# Patient Record
Sex: FEMALE | Race: WHITE | NOT HISPANIC OR LATINO
[De-identification: names, ages, dates, MRNs, and addresses within clinical notes are randomized per-mention and may not be internally consistent; named-entity substitution may affect disease eponyms.]

---

## 2020-03-04 ENCOUNTER — APPOINTMENT (OUTPATIENT)
Dept: UROGYNECOLOGY | Facility: CLINIC | Age: 35
End: 2020-03-04
Payer: COMMERCIAL

## 2020-03-04 VITALS
WEIGHT: 150 LBS | HEIGHT: 63 IN | DIASTOLIC BLOOD PRESSURE: 71 MMHG | BODY MASS INDEX: 26.58 KG/M2 | SYSTOLIC BLOOD PRESSURE: 126 MMHG

## 2020-03-04 DIAGNOSIS — Z78.9 OTHER SPECIFIED HEALTH STATUS: ICD-10-CM

## 2020-03-04 DIAGNOSIS — Z86.19 PERSONAL HISTORY OF OTHER INFECTIOUS AND PARASITIC DISEASES: ICD-10-CM

## 2020-03-04 DIAGNOSIS — Z87.891 PERSONAL HISTORY OF NICOTINE DEPENDENCE: ICD-10-CM

## 2020-03-04 DIAGNOSIS — Z82.49 FAMILY HISTORY OF ISCHEMIC HEART DISEASE AND OTHER DISEASES OF THE CIRCULATORY SYSTEM: ICD-10-CM

## 2020-03-04 DIAGNOSIS — Z87.09 PERSONAL HISTORY OF OTHER DISEASES OF THE RESPIRATORY SYSTEM: ICD-10-CM

## 2020-03-04 DIAGNOSIS — Z86.59 PERSONAL HISTORY OF OTHER MENTAL AND BEHAVIORAL DISORDERS: ICD-10-CM

## 2020-03-04 DIAGNOSIS — Z83.511 FAMILY HISTORY OF GLAUCOMA: ICD-10-CM

## 2020-03-04 DIAGNOSIS — Z83.3 FAMILY HISTORY OF DIABETES MELLITUS: ICD-10-CM

## 2020-03-04 DIAGNOSIS — Z82.3 FAMILY HISTORY OF STROKE: ICD-10-CM

## 2020-03-04 LAB
BILIRUB UR QL STRIP: NEGATIVE
CLARITY UR: CLEAR
COLLECTION METHOD: NORMAL
GLUCOSE UR-MCNC: NEGATIVE
HCG UR QL: 0.2 EU/DL
HGB UR QL STRIP.AUTO: NEGATIVE
KETONES UR-MCNC: NEGATIVE
LEUKOCYTE ESTERASE UR QL STRIP: NEGATIVE
NITRITE UR QL STRIP: NEGATIVE
PH UR STRIP: 6
PROT UR STRIP-MCNC: NEGATIVE
SP GR UR STRIP: 1.01

## 2020-03-04 PROCEDURE — 51701 INSERT BLADDER CATHETER: CPT

## 2020-03-04 PROCEDURE — 99204 OFFICE O/P NEW MOD 45 MIN: CPT | Mod: 25

## 2020-03-04 PROCEDURE — 81003 URINALYSIS AUTO W/O SCOPE: CPT | Mod: QW

## 2020-03-04 RX ORDER — ESCITALOPRAM OXALATE 10 MG/1
10 TABLET ORAL
Qty: 45 | Refills: 0 | Status: ACTIVE | COMMUNITY
Start: 2019-11-14

## 2020-03-04 NOTE — HISTORY OF PRESENT ILLNESS
[FreeTextEntry1] : 36 yo  female with complaints of prolapse and leaking of urine. First noticed the prolapse some time ago as she had to carry her child around quite a bit. She did pelvic floor PT and noticed the prolapse got much worse. Recently she did a heavy workout and the prolapse returned. She feels a vaginal protrusion. Feels pressure and heaviness. 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 Feels she empties fully. Leaks urine and felt the PT helped a great deal. Leaks with urgency and with activity. Not wearing pads. Denies urinary frequency. Not getting up at night. No bowel complaints.

## 2020-03-04 NOTE — OB HISTORY
[Vaginal ___] : [unfilled] vaginal delivery(s) [Definite ___ (Date)] : the last menstrual period was [unfilled] [Normal Amount/Duration] : was of a normal amount and duration [Regular Cycle Intervals] : periods have been regular [Last Pap Smear ___] : date of last pap smear was on [unfilled] [Abnormal Pap Smear] : abnormal pap smear [Sexually Active] : sexually active [Taking Estrogens] : is not taking estrogen replacement [FreeTextEntry1] : Largest baby 6#1oz. Had abn pap and colpo about 7 years ago and repeats have been normal.

## 2020-03-04 NOTE — DISCUSSION/SUMMARY
[FreeTextEntry1] : Mrs. MALDONADO is a 36 yo with moderate midline cystocele, mild uterine prolapse, rare mixed urinary incontinence. We talked about the etiology and natural progression of pelvic organ prolapse. We discussed the treatment options of a pessary, physical therapy or surgery. In terms of surgery we talked about open procedures, robotic assisted procedures and vaginal reconstruction with or without the utilization of graft material. She is still considering having another child. She had great success with PT in the past so I encouraged her to continue with that. She would like to try a pessary so I asked her to return for an initial pessary fitting. I gave her some literature on pelvic floor muscle strengthening. I was able to answer all of her questions.\par

## 2020-03-04 NOTE — PHYSICAL EXAM
[No Acute Distress] : in no acute distress [Well developed] : well developed [Oriented x3] : oriented to person, place, and time [Well Nourished] : ~L well nourished [No Edema] : ~T edema was not present [Warm and Dry] : was warm and dry to touch [Normal Gait] : gait was normal [Normal Appearance] : general appearance was normal [Aa ____] : Aa [unfilled] [2] : 2 [Ba ____] : Ba [unfilled] [C ____] : C [unfilled] [GH ____] : GH [unfilled] [PB ____] : PB [unfilled] [TVL ____] : TVL  [unfilled] [Ap ____] : Ap [unfilled] [Bp ____] : Bp [unfilled] [D ____] : D [unfilled] [Normal] : normal [Post Void Residual ____ml] : post void residual via catheterization was [unfilled] ml [Cough] : no cough [Tenderness] : ~T no ~M abdominal tenderness observed [Distended] : not distended [Hernia] : no hernia observed [FreeTextEntry3] : negative empty stress test

## 2020-03-11 ENCOUNTER — APPOINTMENT (OUTPATIENT)
Dept: UROGYNECOLOGY | Facility: CLINIC | Age: 35
End: 2020-03-11

## 2020-03-16 ENCOUNTER — APPOINTMENT (OUTPATIENT)
Dept: UROGYNECOLOGY | Facility: CLINIC | Age: 35
End: 2020-03-16

## 2020-03-18 ENCOUNTER — APPOINTMENT (OUTPATIENT)
Dept: UROGYNECOLOGY | Facility: CLINIC | Age: 35
End: 2020-03-18

## 2020-05-11 ENCOUNTER — RESULT CHARGE (OUTPATIENT)
Age: 35
End: 2020-05-11

## 2020-05-11 ENCOUNTER — APPOINTMENT (OUTPATIENT)
Dept: UROGYNECOLOGY | Facility: CLINIC | Age: 35
End: 2020-05-11
Payer: COMMERCIAL

## 2020-05-11 VITALS
SYSTOLIC BLOOD PRESSURE: 108 MMHG | HEIGHT: 63 IN | DIASTOLIC BLOOD PRESSURE: 71 MMHG | WEIGHT: 150 LBS | BODY MASS INDEX: 26.58 KG/M2

## 2020-05-11 LAB
BILIRUB UR QL STRIP: NORMAL
CLARITY UR: CLEAR
COLLECTION METHOD: NORMAL
GLUCOSE UR-MCNC: NEGATIVE
HCG UR QL: 0.2 EU/DL
HGB UR QL STRIP.AUTO: NEGATIVE
KETONES UR-MCNC: NEGATIVE
LEUKOCYTE ESTERASE UR QL STRIP: NEGATIVE
NITRITE UR QL STRIP: NEGATIVE
PH UR STRIP: 7
PROT UR STRIP-MCNC: NEGATIVE
SP GR UR STRIP: 1.02

## 2020-05-11 PROCEDURE — 99213 OFFICE O/P EST LOW 20 MIN: CPT | Mod: 25

## 2020-05-11 PROCEDURE — 51798 US URINE CAPACITY MEASURE: CPT

## 2020-05-11 PROCEDURE — 81003 URINALYSIS AUTO W/O SCOPE: CPT | Mod: QW

## 2020-05-11 PROCEDURE — A4561: CPT

## 2020-05-11 NOTE — HISTORY OF PRESENT ILLNESS
[FreeTextEntry1] : Here for intial pessary fitting. Has been doing PT and feels that is has helped. Has been doing exercises on her own and feels she notices a difference.

## 2020-05-11 NOTE — DISCUSSION/SUMMARY
[FreeTextEntry1] : Initial pessary visit today. #3 ring good fit. She is bale to manage herself. I asked her to return in 2 weeks for a pessary check and then can move to yearly if doing self care.

## 2020-05-11 NOTE — COUNSELING
[FreeTextEntry1] : #3 ring with knob placed. Patient walked around and started to feel uncomfortable. I replaced with #3 ring with support and this felt better. She was able to take the pessary in and out on her own.

## 2020-05-11 NOTE — PHYSICAL EXAM
[Chaperone Present] : A chaperone was present in the examining room during all aspects of the physical examination [Normal Appearance] : general appearance was normal [Normal] : no abnormalities

## 2020-06-01 ENCOUNTER — APPOINTMENT (OUTPATIENT)
Dept: UROGYNECOLOGY | Facility: CLINIC | Age: 35
End: 2020-06-01
Payer: COMMERCIAL

## 2020-06-01 VITALS
HEIGHT: 63 IN | DIASTOLIC BLOOD PRESSURE: 73 MMHG | WEIGHT: 150 LBS | BODY MASS INDEX: 26.58 KG/M2 | SYSTOLIC BLOOD PRESSURE: 117 MMHG

## 2020-06-01 LAB
BILIRUB UR QL STRIP: NEGATIVE
CLARITY UR: CLEAR
COLLECTION METHOD: NORMAL
GLUCOSE UR-MCNC: NEGATIVE
HCG UR QL: 0.2 EU/DL
HGB UR QL STRIP.AUTO: NEGATIVE
KETONES UR-MCNC: NEGATIVE
LEUKOCYTE ESTERASE UR QL STRIP: NEGATIVE
NITRITE UR QL STRIP: NEGATIVE
PH UR STRIP: 6
PROT UR STRIP-MCNC: NEGATIVE
SP GR UR STRIP: 1.02

## 2020-06-01 PROCEDURE — 99213 OFFICE O/P EST LOW 20 MIN: CPT | Mod: 25

## 2020-06-01 PROCEDURE — 81003 URINALYSIS AUTO W/O SCOPE: CPT | Mod: QW

## 2020-06-01 PROCEDURE — 51798 US URINE CAPACITY MEASURE: CPT

## 2020-06-01 NOTE — PHYSICAL EXAM
[Chaperone Present] : A chaperone was present in the examining room during all aspects of the physical examination [Normal Appearance] : general appearance was normal [Normal] : no abnormalities [FreeTextEntry4] : #3 ring with support removed, vagina inspected and no excoriations. Pessary good fit. Cleaned and put back in.

## 2020-06-01 NOTE — HISTORY OF PRESENT ILLNESS
[FreeTextEntry1] : Had pessary placed 3 weeks ago and has been taking it in and out on her own without any difficulty. Initially had some cramping but now doesn’t even notice it when it is in. No issues with urinating with pessary or BM's. Had a couple of episodes of leaking with it in but is not too bad.

## 2020-08-03 ENCOUNTER — APPOINTMENT (OUTPATIENT)
Dept: INTERNAL MEDICINE | Facility: CLINIC | Age: 35
End: 2020-08-03
Payer: COMMERCIAL

## 2020-08-03 VITALS
BODY MASS INDEX: 26.84 KG/M2 | HEART RATE: 80 BPM | SYSTOLIC BLOOD PRESSURE: 108 MMHG | TEMPERATURE: 98 F | OXYGEN SATURATION: 98 % | WEIGHT: 151.5 LBS | DIASTOLIC BLOOD PRESSURE: 64 MMHG | HEIGHT: 63 IN

## 2020-08-03 DIAGNOSIS — Z20.828 CONTACT WITH AND (SUSPECTED) EXPOSURE TO OTHER VIRAL COMMUNICABLE DISEASES: ICD-10-CM

## 2020-08-03 DIAGNOSIS — Z00.00 ENCOUNTER FOR GENERAL ADULT MEDICAL EXAMINATION W/OUT ABNORMAL FINDINGS: ICD-10-CM

## 2020-08-03 DIAGNOSIS — E03.9 HYPOTHYROIDISM, UNSPECIFIED: ICD-10-CM

## 2020-08-03 DIAGNOSIS — R73.9 HYPERGLYCEMIA, UNSPECIFIED: ICD-10-CM

## 2020-08-03 PROCEDURE — 36415 COLL VENOUS BLD VENIPUNCTURE: CPT

## 2020-08-03 PROCEDURE — 99385 PREV VISIT NEW AGE 18-39: CPT | Mod: 25

## 2020-08-03 NOTE — PHYSICAL EXAM
[No Acute Distress] : no acute distress [Well Developed] : well developed [Well Nourished] : well nourished [Normal Sclera/Conjunctiva] : normal sclera/conjunctiva [Well-Appearing] : well-appearing [EOMI] : extraocular movements intact [PERRL] : pupils equal round and reactive to light [Normal Outer Ear/Nose] : the outer ears and nose were normal in appearance [No JVD] : no jugular venous distention [Normal Oropharynx] : the oropharynx was normal [No Lymphadenopathy] : no lymphadenopathy [Supple] : supple [Thyroid Normal, No Nodules] : the thyroid was normal and there were no nodules present [No Respiratory Distress] : no respiratory distress  [No Accessory Muscle Use] : no accessory muscle use [Clear to Auscultation] : lungs were clear to auscultation bilaterally [Regular Rhythm] : with a regular rhythm [Normal S1, S2] : normal S1 and S2 [Normal Rate] : normal rate  [No Murmur] : no murmur heard [No Carotid Bruits] : no carotid bruits [No Abdominal Bruit] : a ~M bruit was not heard ~T in the abdomen [No Varicosities] : no varicosities [Pedal Pulses Present] : the pedal pulses are present [No Edema] : there was no peripheral edema [No Palpable Aorta] : no palpable aorta [Non Tender] : non-tender [No Extremity Clubbing/Cyanosis] : no extremity clubbing/cyanosis [Soft] : abdomen soft [Non-distended] : non-distended [Normal Bowel Sounds] : normal bowel sounds [No HSM] : no HSM [No Masses] : no abdominal mass palpated [Normal Posterior Cervical Nodes] : no posterior cervical lymphadenopathy [Normal Anterior Cervical Nodes] : no anterior cervical lymphadenopathy [No CVA Tenderness] : no CVA  tenderness [No Joint Swelling] : no joint swelling [No Spinal Tenderness] : no spinal tenderness [Coordination Grossly Intact] : coordination grossly intact [No Rash] : no rash [Grossly Normal Strength/Tone] : grossly normal strength/tone [No Focal Deficits] : no focal deficits [Normal Gait] : normal gait [Normal Affect] : the affect was normal [Normal Insight/Judgement] : insight and judgment were intact

## 2020-08-03 NOTE — HEALTH RISK ASSESSMENT
[Patient declined mammogram] : Patient declined mammogram [Patient reported PAP Smear was normal] : Patient reported PAP Smear was normal [Patient declined colonoscopy] : Patient declined colonoscopy [Patient declined bone density test] : Patient declined bone density test [Good] : ~his/her~  mood as  good [] : No [Yes] : Yes [Monthly or less (1 pt)] : Monthly or less (1 point) [1 or 2 (0 pts)] : 1 or 2 (0 points) [Never (0 pts)] : Never (0 points) [No] : In the past 12 months have you used drugs other than those required for medical reasons? No [0] : 2) Feeling down, depressed, or hopeless: Not at all (0) [Audit-CScore] : 1 [ABO1Rxozq] : 0 [Patient declined Low Dose CT Scan] : Patient declined Low Dose CT Scan [Patient declined Retinal Exam] : Patient declined Retinal Exam. [HIV test declined] : HIV test declined [Hepatitis C test declined] : Hepatitis C test declined [PapSmearDate] : 2019

## 2020-08-03 NOTE — HISTORY OF PRESENT ILLNESS
[FreeTextEntry1] : Physical exam.  [de-identified] : Ms. JENIFFER LARKIN is a 35 year female comes to the office for physical exam. Patient feels well and has no complaints at this time.

## 2020-08-04 DIAGNOSIS — E78.00 PURE HYPERCHOLESTEROLEMIA, UNSPECIFIED: ICD-10-CM

## 2020-08-04 LAB
ALBUMIN SERPL ELPH-MCNC: 4.7 G/DL
ALP BLD-CCNC: 38 U/L
ALT SERPL-CCNC: 15 U/L
ANION GAP SERPL CALC-SCNC: 13 MMOL/L
AST SERPL-CCNC: 16 U/L
BASOPHILS # BLD AUTO: 0.04 K/UL
BASOPHILS NFR BLD AUTO: 0.7 %
BILIRUB SERPL-MCNC: 0.2 MG/DL
BUN SERPL-MCNC: 11 MG/DL
CALCIUM SERPL-MCNC: 9.6 MG/DL
CHLORIDE SERPL-SCNC: 104 MMOL/L
CHOLEST SERPL-MCNC: 267 MG/DL
CHOLEST/HDLC SERPL: 4.7 RATIO
CO2 SERPL-SCNC: 24 MMOL/L
CREAT SERPL-MCNC: 0.65 MG/DL
EOSINOPHIL # BLD AUTO: 0.22 K/UL
EOSINOPHIL NFR BLD AUTO: 3.6 %
ESTIMATED AVERAGE GLUCOSE: 100 MG/DL
GLUCOSE SERPL-MCNC: 86 MG/DL
HBA1C MFR BLD HPLC: 5.1 %
HCT VFR BLD CALC: 41.9 %
HDLC SERPL-MCNC: 57 MG/DL
HGB BLD-MCNC: 13.4 G/DL
IMM GRANULOCYTES NFR BLD AUTO: 0.3 %
LDLC SERPL CALC-MCNC: 171 MG/DL
LYMPHOCYTES # BLD AUTO: 1.52 K/UL
LYMPHOCYTES NFR BLD AUTO: 25 %
MAN DIFF?: NORMAL
MCHC RBC-ENTMCNC: 28.8 PG
MCHC RBC-ENTMCNC: 32 GM/DL
MCV RBC AUTO: 90.1 FL
MONOCYTES # BLD AUTO: 0.36 K/UL
MONOCYTES NFR BLD AUTO: 5.9 %
NEUTROPHILS # BLD AUTO: 3.92 K/UL
NEUTROPHILS NFR BLD AUTO: 64.5 %
PLATELET # BLD AUTO: 256 K/UL
POTASSIUM SERPL-SCNC: 3.9 MMOL/L
PROT SERPL-MCNC: 6.8 G/DL
RBC # BLD: 4.65 M/UL
RBC # FLD: 12.4 %
SODIUM SERPL-SCNC: 141 MMOL/L
TRIGL SERPL-MCNC: 192 MG/DL
TSH SERPL-ACNC: 1.92 UIU/ML
WBC # FLD AUTO: 6.08 K/UL

## 2020-08-04 RX ORDER — ATORVASTATIN CALCIUM 20 MG/1
20 TABLET, FILM COATED ORAL
Qty: 90 | Refills: 1 | Status: ACTIVE | COMMUNITY
Start: 2020-08-04 | End: 1900-01-01

## 2020-08-20 ENCOUNTER — TRANSCRIPTION ENCOUNTER (OUTPATIENT)
Age: 35
End: 2020-08-20

## 2021-08-16 ENCOUNTER — APPOINTMENT (OUTPATIENT)
Dept: UROGYNECOLOGY | Facility: CLINIC | Age: 36
End: 2021-08-16
Payer: COMMERCIAL

## 2021-08-16 VITALS — HEART RATE: 73 BPM | SYSTOLIC BLOOD PRESSURE: 106 MMHG | DIASTOLIC BLOOD PRESSURE: 62 MMHG

## 2021-08-16 PROCEDURE — A4562: CPT

## 2021-08-16 PROCEDURE — 99214 OFFICE O/P EST MOD 30 MIN: CPT

## 2021-10-22 ENCOUNTER — TRANSCRIPTION ENCOUNTER (OUTPATIENT)
Age: 36
End: 2021-10-22

## 2021-11-09 ENCOUNTER — APPOINTMENT (OUTPATIENT)
Dept: INTERNAL MEDICINE | Facility: CLINIC | Age: 36
End: 2021-11-09

## 2021-11-29 ENCOUNTER — APPOINTMENT (OUTPATIENT)
Dept: UROGYNECOLOGY | Facility: CLINIC | Age: 36
End: 2021-11-29
Payer: COMMERCIAL

## 2021-11-29 ENCOUNTER — RESULT CHARGE (OUTPATIENT)
Age: 36
End: 2021-11-29

## 2021-11-29 DIAGNOSIS — R10.2 PELVIC AND PERINEAL PAIN: ICD-10-CM

## 2021-11-29 LAB
BILIRUB UR QL STRIP: NEGATIVE
CLARITY UR: CLEAR
COLLECTION METHOD: NORMAL
GLUCOSE UR-MCNC: NEGATIVE
HCG UR QL: 0.2 EU/DL
HGB UR QL STRIP.AUTO: NEGATIVE
KETONES UR-MCNC: NEGATIVE
LEUKOCYTE ESTERASE UR QL STRIP: NEGATIVE
NITRITE UR QL STRIP: NEGATIVE
PH UR STRIP: 5
PROT UR STRIP-MCNC: NORMAL
SP GR UR STRIP: 1.03

## 2021-11-29 PROCEDURE — 51798 US URINE CAPACITY MEASURE: CPT

## 2021-11-29 PROCEDURE — 99213 OFFICE O/P EST LOW 20 MIN: CPT | Mod: 25

## 2021-11-29 NOTE — DISCUSSION/SUMMARY
[FreeTextEntry1] : Compared to my exam from 1-2 years ago there seems to be worsening of her posterior compartment support. She has had the pessary in some I do not think I am getting the most severe degree of her prolapse on today's exam. We reviewed options. She is worried about having surgery and taking care of the new born. We talked about native tissue repair vs mesh augmented repairs, abdominal vs vaginal repair, hysterectomy vs leaving he uterus is. We also talked about trying a different pessary. She is going to try to get back to [facility] for PT. Will come back for another pessary, understanding that some of the different shapes may not be as easy to take in and out on her own.

## 2021-11-29 NOTE — HISTORY OF PRESENT ILLNESS
[FreeTextEntry1] : Had baby 7/2021. She came back for pessary check in august and was upped from #3RS to #4 RS. She feels the prolapse is coming around the pessary and she feels more pressure and more discomfort the more active she is. She did not have any symptoms of prolapse during pregnancy. She was leaking with pregnancy and she had hyperemesis so it was bad. She is not really leaking now. She is breast feeding but getting a period. She still goes to PT but is paying out of pocket and it is costly. She is trying to do the exercises on her own but some days it is really hard to do with her schedule.

## 2021-11-29 NOTE — PHYSICAL EXAM
[Chaperone Present] : A chaperone was present in the examining room during all aspects of the physical examination [Normal Appearance] : general appearance was normal [2] : 2 [Aa ____] : Aa [unfilled] [Ba ____] : Ba [unfilled] [C ____] : C [unfilled] [GH ____] : GH [unfilled] [PB ____] : PB [unfilled] [TVL ____] : TVL  [unfilled] [Ap ____] : Ap [unfilled] [Bp ____] : Bp [unfilled] [D ____] : D [unfilled] [Normal] : no abnormalities

## 2021-12-21 ENCOUNTER — APPOINTMENT (OUTPATIENT)
Dept: UROGYNECOLOGY | Facility: CLINIC | Age: 36
End: 2021-12-21

## 2022-01-19 ENCOUNTER — APPOINTMENT (OUTPATIENT)
Dept: INTERNAL MEDICINE | Facility: CLINIC | Age: 37
End: 2022-01-19

## 2022-02-04 ENCOUNTER — APPOINTMENT (OUTPATIENT)
Dept: UROGYNECOLOGY | Facility: CLINIC | Age: 37
End: 2022-02-04
Payer: COMMERCIAL

## 2022-02-04 DIAGNOSIS — N81.9 FEMALE GENITAL PROLAPSE, UNSPECIFIED: ICD-10-CM

## 2022-02-04 DIAGNOSIS — R32 UNSPECIFIED URINARY INCONTINENCE: ICD-10-CM

## 2022-02-04 PROCEDURE — A4562: CPT

## 2022-02-04 PROCEDURE — 99213 OFFICE O/P EST LOW 20 MIN: CPT

## 2022-08-17 ENCOUNTER — APPOINTMENT (OUTPATIENT)
Dept: UROGYNECOLOGY | Facility: CLINIC | Age: 37
End: 2022-08-17

## 2022-08-17 VITALS — SYSTOLIC BLOOD PRESSURE: 104 MMHG | DIASTOLIC BLOOD PRESSURE: 70 MMHG

## 2022-08-17 PROCEDURE — 99214 OFFICE O/P EST MOD 30 MIN: CPT

## 2022-08-17 PROCEDURE — A4562: CPT

## 2022-09-16 ENCOUNTER — NON-APPOINTMENT (OUTPATIENT)
Age: 37
End: 2022-09-16

## 2022-09-23 ENCOUNTER — APPOINTMENT (OUTPATIENT)
Dept: UROGYNECOLOGY | Facility: CLINIC | Age: 37
End: 2022-09-23

## 2022-09-23 DIAGNOSIS — R10.2 PELVIC AND PERINEAL PAIN: ICD-10-CM

## 2022-09-23 DIAGNOSIS — N81.6 RECTOCELE: ICD-10-CM

## 2022-09-23 PROCEDURE — 99214 OFFICE O/P EST MOD 30 MIN: CPT

## 2022-09-23 PROCEDURE — A4562: CPT

## 2022-12-23 ENCOUNTER — APPOINTMENT (OUTPATIENT)
Dept: UROGYNECOLOGY | Facility: CLINIC | Age: 37
End: 2022-12-23

## 2023-06-26 ENCOUNTER — APPOINTMENT (OUTPATIENT)
Dept: PLASTIC SURGERY | Facility: CLINIC | Age: 38
End: 2023-06-26

## 2023-11-27 ENCOUNTER — OUTPATIENT (OUTPATIENT)
Dept: OUTPATIENT SERVICES | Facility: HOSPITAL | Age: 38
LOS: 1 days | End: 2023-11-27

## 2023-11-27 VITALS
DIASTOLIC BLOOD PRESSURE: 78 MMHG | WEIGHT: 134.04 LBS | SYSTOLIC BLOOD PRESSURE: 118 MMHG | OXYGEN SATURATION: 98 % | RESPIRATION RATE: 18 BRPM | HEART RATE: 67 BPM | HEIGHT: 62 IN | TEMPERATURE: 98 F

## 2023-11-27 DIAGNOSIS — R63.4 ABNORMAL WEIGHT LOSS: ICD-10-CM

## 2023-11-27 DIAGNOSIS — Z41.1 ENCOUNTER FOR COSMETIC SURGERY: ICD-10-CM

## 2023-11-27 DIAGNOSIS — Z98.890 OTHER SPECIFIED POSTPROCEDURAL STATES: Chronic | ICD-10-CM

## 2023-11-27 LAB
HCG UR QL: NEGATIVE — SIGNIFICANT CHANGE UP
HCG UR QL: NEGATIVE — SIGNIFICANT CHANGE UP

## 2023-11-27 RX ORDER — SODIUM CHLORIDE 9 MG/ML
1000 INJECTION, SOLUTION INTRAVENOUS
Refills: 0 | Status: DISCONTINUED | OUTPATIENT
Start: 2023-12-07 | End: 2023-12-21

## 2023-11-27 NOTE — H&P PST ADULT - NEGATIVE BREAST SYMPTOMS
Requested call back to discuss results and if patient should start medication.    Please call to discuss and ok to leave detailed message.      no breast tenderness L/no breast tenderness R/no breast lump L/no breast lump R/no nipple discharge L/no nipple discharge R

## 2023-11-27 NOTE — H&P PST ADULT - NEGATIVE ENDOCRINE SYMPTOMS
Type and screen sent to [facility].     Pt's pre-op dose of Metoprolol 25mg was held due to heart rate of 48-50. Dr. Layton has been notified.          no cold intolerance/no heat intolerance/no striae

## 2023-11-27 NOTE — H&P PST ADULT - RESPIRATORY AND THORAX
Please call and notify patient that his hepatitis C screening was negative (we won't have to screen for that again), PSA was in normal range, comprehensive metabolic panel showed glucose in pre-diabetes range, so I'd recommend watching his carbohydrate intake and getting some form of regular exercise to help prevent that from becoming overt diabetes. Lipid panel showed high LDL or \"bad cholesterol\" and triglycerides. His risk for heart disease based on these scores is high enough that (besides changing how he's eating and exercising) we should take time to discuss a low dose of a cholesterol medication to reduce his risk of heart attack and stroke. If he agrees, please help him make an appointment with me to discuss all of this in more detail. Please provide mailed copies if desired, and encourage patient to sign up for Jose. Thanks.     details…

## 2023-11-27 NOTE — H&P PST ADULT - NSICDXFAMILYHX_GEN_ALL_CORE_FT
FAMILY HISTORY:  Father  Still living? Unknown  FH: diabetes mellitus, Age at diagnosis: Age Unknown    Mother  Still living? Unknown  FH: hypertension, Age at diagnosis: Age Unknown  FH: myocardial infarction, Age at diagnosis: Age Unknown

## 2023-11-27 NOTE — H&P PST ADULT - NEGATIVE ENMT SYMPTOMS
no hearing difficulty/no tinnitus/no vertigo/no sinus symptoms/no nasal congestion/no nasal discharge/no nasal obstruction/no abnormal taste sensation/no gum bleeding/no dry mouth/no throat pain/no dysphagia

## 2023-11-27 NOTE — H&P PST ADULT - PROBLEM SELECTOR PLAN 1
Patient tentatively scheduled for Bilateral breast scar revision; Liposuction to torso on 12/7/23.  Pre-op instructions provided. Pt given verbal and written instructions with teach back on chlorhexidine shampoo and pepcid. Pt verbalized understanding with return demonstration.     Copy of CBC, BMP, EKG in chart.  UCG was done at [facility].  No further evaluations requested. Patient tentatively scheduled for Bilateral breast scar revision; Liposuction to torso on 12/7/23.  Pre-op instructions provided. Pt given verbal and written instructions with teach back on chlorhexidine shampoo and pepcid. Pt verbalized understanding with return demonstration.     Copy of CBC, BMP, EKG in chart.  UCG was done at [facility].  No further evaluations requested. Patient tentatively scheduled for Bilateral breast scar revision; Liposuction to torso on 12/7/23.  Pre-op instructions provided. Pt given verbal and written instructions with teach back on chlorhexidine shampoo and pepcid. Pt verbalized understanding with return demonstration.     Copy of CBC, BMP, EKG in chart.  UCG was done at [facility].  No further evaluations requested. Patient tentatively scheduled for Bilateral breast scar revision; Liposuction to torso on 12/7/23.  Pre-op instructions provided. Pt given verbal and written instructions with teach back on chlorhexidine shampoo and pepcid. Pt verbalized understanding with return demonstration.     Copy of CBC, BMP, Ua, EKG, medical evaluation in chart.  UCG was done at [facility].  No further evaluations requested. Patient tentatively scheduled for Bilateral breast scar revision; Liposuction to torso on 12/7/23.  Pre-op instructions provided. Pt given verbal and written instructions with teach back on chlorhexidine shampoo and pepcid. Pt verbalized understanding with return demonstration.     Copy of CBC, BMP, Ua, EKG, medical evaluation in chart.  [facility] done at [facility].  No further evaluations requested. Patient tentatively scheduled for Bilateral breast scar revision; Liposuction to torso on 12/7/23.  Pre-op instructions provided. Pt given verbal and written instructions with teach back on chlorhexidine shampoo and pepcid. Pt verbalized understanding with return demonstration.     Copy of CBC, BMP, Ua, EKG, medical evaluation in chart.  UCG was done at [facility].  No further evaluations requested.

## 2023-11-27 NOTE — H&P PST ADULT - HISTORY OF PRESENT ILLNESS
38 year old female with pmhx of Anxiety and depression, Asthma, presents for pre-op evaluation for diagnosis of Encounter for cosmetic surgery. Patient is scheduled for Bilateral breast scar revision; Liposuction to torso.

## 2023-12-06 ENCOUNTER — TRANSCRIPTION ENCOUNTER (OUTPATIENT)
Age: 38
End: 2023-12-06

## 2023-12-06 NOTE — ASU PATIENT PROFILE, ADULT - NS PRO TALK SOMEONE YN
Instructions: This plan will send the code FBSD to the PM system.  DO NOT or CHANGE the price.
Price (Do Not Change): 0.00
Detail Level: Simple
no

## 2023-12-06 NOTE — ASU PATIENT PROFILE, ADULT - FALL HARM RISK - UNIVERSAL INTERVENTIONS
Bed in lowest position, wheels locked, appropriate side rails in place/Call bell, personal items and telephone in reach/Instruct patient to call for assistance before getting out of bed or chair/Non-slip footwear when patient is out of bed/[place] to call system/Physically safe environment - no spills, clutter or unnecessary equipment/Purposeful Proactive Rounding/Room/bathroom lighting operational, light cord in reach Bed in lowest position, wheels locked, appropriate side rails in place/Call bell, personal items and telephone in reach/Instruct patient to call for assistance before getting out of bed or chair/Non-slip footwear when patient is out of bed/[place] to call system/Physically safe environment - no spills, clutter or unnecessary equipment/Purposeful Proactive Rounding/Room/bathroom lighting operational, light cord in reach

## 2023-12-07 ENCOUNTER — OUTPATIENT (OUTPATIENT)
Dept: OUTPATIENT SERVICES | Facility: HOSPITAL | Age: 38
LOS: 1 days | Discharge: HOME | End: 2023-12-07
Payer: COMMERCIAL

## 2023-12-07 ENCOUNTER — TRANSCRIPTION ENCOUNTER (OUTPATIENT)
Age: 38
End: 2023-12-07

## 2023-12-07 VITALS
SYSTOLIC BLOOD PRESSURE: 91 MMHG | OXYGEN SATURATION: 97 % | RESPIRATION RATE: 18 BRPM | HEART RATE: 89 BPM | DIASTOLIC BLOOD PRESSURE: 51 MMHG

## 2023-12-07 VITALS
TEMPERATURE: 98 F | OXYGEN SATURATION: 100 % | HEART RATE: 64 BPM | HEIGHT: 62 IN | SYSTOLIC BLOOD PRESSURE: 109 MMHG | RESPIRATION RATE: 18 BRPM | DIASTOLIC BLOOD PRESSURE: 68 MMHG | WEIGHT: 134.04 LBS

## 2023-12-07 DIAGNOSIS — Z98.890 OTHER SPECIFIED POSTPROCEDURAL STATES: Chronic | ICD-10-CM

## 2023-12-07 DIAGNOSIS — Z41.1 ENCOUNTER FOR COSMETIC SURGERY: ICD-10-CM

## 2023-12-07 LAB
HCG UR QL: NEGATIVE — SIGNIFICANT CHANGE UP
HCG UR QL: NEGATIVE — SIGNIFICANT CHANGE UP

## 2023-12-07 PROCEDURE — 88304 TISSUE EXAM BY PATHOLOGIST: CPT | Mod: 26

## 2023-12-07 RX ORDER — ACETAMINOPHEN 500 MG
650 TABLET ORAL ONCE
Refills: 0 | Status: COMPLETED | OUTPATIENT
Start: 2023-12-07 | End: 2023-12-07

## 2023-12-07 RX ORDER — ONDANSETRON 8 MG/1
4 TABLET, FILM COATED ORAL ONCE
Refills: 0 | Status: DISCONTINUED | OUTPATIENT
Start: 2023-12-07 | End: 2023-12-21

## 2023-12-07 RX ORDER — FENTANYL CITRATE 50 UG/ML
25 INJECTION INTRAVENOUS
Refills: 0 | Status: DISCONTINUED | OUTPATIENT
Start: 2023-12-07 | End: 2023-12-07

## 2023-12-07 RX ORDER — SODIUM CHLORIDE 9 MG/ML
500 INJECTION, SOLUTION INTRAVENOUS ONCE
Refills: 0 | Status: COMPLETED | OUTPATIENT
Start: 2023-12-07 | End: 2023-12-07

## 2023-12-07 RX ADMIN — Medication 650 MILLIGRAM(S): at 15:55

## 2023-12-07 RX ADMIN — Medication 650 MILLIGRAM(S): at 15:21

## 2023-12-07 RX ADMIN — SODIUM CHLORIDE 500 MILLILITER(S): 9 INJECTION, SOLUTION INTRAVENOUS at 12:35

## 2023-12-07 NOTE — BRIEF OPERATIVE NOTE - NSICDXBRIEFPREOP_GEN_ALL_CORE_FT
PRE-OP DIAGNOSIS:  Lipodystrophy 07-Dec-2023 10:40:30  Bruce Beatty  Scar of female breast 07-Dec-2023 10:40:27  Bruce Beatty

## 2023-12-07 NOTE — ASU DISCHARGE PLAN (ADULT/PEDIATRIC) - CARE PROVIDER_API CALL
[address] [facility]  Rehabilitation  RECREATIONAL THERAPY    Recreation Therapy Note    Date:  10/5/2018        Patient Name: Toney Ro       MRN: [id number]        YOB: 1969 (51 y.o.)       Gender: female  Diagnosis: Hepatic Encephalopathy with generalized weakness  Referring Practitioner: Dr Morgan Murphy    RESTRICTIONS/PRECAUTIONS:  Restrictions/Precautions: Fall Risk, Seizure  Vision:  (minimal environmental scanning noted)  Hearing: Within functional limits     [id number]   [x] Recreation Therapy referral received. [x] Chart reviewed         [x] RT assessment started       [] Recreation Therapy services partially discussed with patient. Note: Attempted to see pt. Pt was fixated on getting into bed. The patient did not make eye contact and become irritable when expressions of concern such as \"What hurts Meyers Quinones? \" were asked. She was able to give very little personal history. Nursing had to come in put patient into bed at her insistence and decreased in insight as she was trying to get out of bed with her footrests on as well as try table in the way. Will continue to attempt to complete leisure assessment.      Electronically signed by: Viji Cortes  Date: 10/5/2018   Electronically signed by Viji Cortes on 10/5/2018 at 9:56 AM Bruce Beatty  Plastic Surgery  [address] [facility] [address]  [place] [address]  Phone: [phone]  Fax: [phone]  Follow Up Time: 1-3 days   Bruce Beatty  Plastic Surgery  [address], [address]  [place] [address]  Phone: [phone]  Fax: [phone]  Follow Up Time: 1-3 days

## 2023-12-07 NOTE — ASU DISCHARGE PLAN (ADULT/PEDIATRIC) - NURSING INSTRUCTIONS
DO NOT take any Tylenol (Acetaminophen) or narcotics containing Tylenol until after  2:30p. You received Tylenol during your operation and it can cause damage to your liver if too much is taken within a 24 hour time period.

## 2023-12-07 NOTE — ASU DISCHARGE PLAN (ADULT/PEDIATRIC) - NS MD DC FALL RISK RISK
For information on Fall & Injury Prevention, visit: [email or web address] OR  [email or web address] OR  https://www.cdc.gov/steadi/patient.html For information on Fall & Injury Prevention, visit: [email or web address] OR  [email or web address] OR  https://www.cdc.gov/steadi/patient.html

## 2023-12-07 NOTE — BRIEF OPERATIVE NOTE - NSICDXBRIEFPOSTOP_GEN_ALL_CORE_FT
POST-OP DIAGNOSIS:  Scar of female breast 07-Dec-2023 10:40:18  Bruce Beatty  Lipodystrophy 07-Dec-2023 10:40:25  Bruce Beatty

## 2023-12-07 NOTE — BRIEF OPERATIVE NOTE - NSICDXBRIEFPROCEDURE_GEN_ALL_CORE_FT
PROCEDURES:  Surgical preparation of recipient site by excision of scar of trunk 07-Dec-2023 10:40:54  Bruce Beatty  Liposuction, torso 07-Dec-2023 10:41:06  Bruce Beatty  Liposuction of neck for cosmesis 07-Dec-2023 10:41:29  Bruce Beatty   Implemented All Universal Safety Interventions:  [place] to call system. Call bell, personal items and telephone within reach. Instruct patient to call for assistance. Room bathroom lighting operational. Non-slip footwear when patient is off stretcher. Physically safe environment: no spills, clutter or unnecessary equipment. Stretcher in lowest position, wheels locked, appropriate side rails in place.

## 2023-12-07 NOTE — ASU DISCHARGE PLAN (ADULT/PEDIATRIC) - PROVIDER TOKENS
PROVIDER:[TOKEN:[[id number]],FOLLOWUP:[1-3 days]] PROVIDER:[TOKEN:[[id number]],FOLLOWUP:[1-3 days]]

## 2023-12-07 NOTE — ASU DISCHARGE PLAN (ADULT/PEDIATRIC) - ASU DC SPECIAL INSTRUCTIONSFT
No heavy lifting/pushing/pulling/strenuous activity.   Empty and record drain output twice daily.   Take all medications sent from our office as prescribed.   Sleep with head of bed elevated to help minimize swelling.   Keep all surgical garments in place until follow up in office.

## 2023-12-07 NOTE — ASU DISCHARGE PLAN (ADULT/PEDIATRIC) - FOLLOW UP APPOINTMENTS
[phone] may also call Recovery Room (PACU) 24/7 @ [phone] may also call Recovery Room (PACU) 24/7 @ [phone]

## 2023-12-21 LAB
SURGICAL PATHOLOGY STUDY: SIGNIFICANT CHANGE UP
SURGICAL PATHOLOGY STUDY: SIGNIFICANT CHANGE UP

## 2023-12-22 NOTE — ASU PATIENT PROFILE, ADULT - PAIN SCALE PREFERRED, PROFILE
Show Applicator Variable?: Yes Post-Care Instructions: I reviewed with the patient in detail post-care instructions. Patient is to wear sunprotection, and avoid picking at any of the treated lesions. Pt may apply Vaseline to crusted or scabbing areas. Spray Paint Text: The liquid nitrogen was applied to the skin utilizing a spray paint frosting technique. Consent: The patient's consent was obtained including but not limited to risks of crusting, scabbing, blistering, scarring, darker or lighter pigmentary change, recurrence, incomplete removal and infection. Add 52 Modifier (Optional): no Medical Necessity Information: It is in your best interest to select a reason for this procedure from the list below. All of these items fulfill various CMS LCD requirements except the new and changing color options. Medical Necessity Clause: This procedure was medically necessary because the lesions that were treated were: Detail Level: Detailed numerical 0-10

## 2024-03-08 ENCOUNTER — INPATIENT (INPATIENT)
Facility: HOSPITAL | Age: 39
LOS: 3 days | Discharge: HOME | End: 2024-03-12
Attending: STUDENT IN AN ORGANIZED HEALTH CARE EDUCATION/TRAINING PROGRAM | Admitting: STUDENT IN AN ORGANIZED HEALTH CARE EDUCATION/TRAINING PROGRAM
Payer: COMMERCIAL

## 2024-03-08 VITALS
HEART RATE: 128 BPM | OXYGEN SATURATION: 100 % | SYSTOLIC BLOOD PRESSURE: 143 MMHG | RESPIRATION RATE: 18 BRPM | DIASTOLIC BLOOD PRESSURE: 75 MMHG | TEMPERATURE: 100 F

## 2024-03-08 DIAGNOSIS — Z98.890 OTHER SPECIFIED POSTPROCEDURAL STATES: Chronic | ICD-10-CM

## 2024-03-08 DIAGNOSIS — R50.9 FEVER, UNSPECIFIED: ICD-10-CM

## 2024-03-08 PROBLEM — J30.2 OTHER SEASONAL ALLERGIC RHINITIS: Chronic | Status: ACTIVE | Noted: 2023-11-27

## 2024-03-08 PROBLEM — Z41.1 ENCOUNTER FOR COSMETIC SURGERY: Chronic | Status: ACTIVE | Noted: 2023-11-27

## 2024-03-08 LAB
ALBUMIN SERPL ELPH-MCNC: 4 G/DL — SIGNIFICANT CHANGE UP (ref 3.3–5)
ALP SERPL-CCNC: 49 U/L — SIGNIFICANT CHANGE UP (ref 40–120)
ALT FLD-CCNC: 17 U/L — SIGNIFICANT CHANGE UP (ref 4–33)
ANION GAP SERPL CALC-SCNC: 13 MMOL/L — SIGNIFICANT CHANGE UP (ref 7–14)
APPEARANCE UR: CLEAR — SIGNIFICANT CHANGE UP
AST SERPL-CCNC: 37 U/L — HIGH (ref 4–32)
BASE EXCESS BLDV CALC-SCNC: -2.2 MMOL/L — LOW (ref -2–3)
BASE EXCESS BLDV CALC-SCNC: -2.6 MMOL/L — LOW (ref -2–3)
BASOPHILS # BLD AUTO: 0.02 K/UL — SIGNIFICANT CHANGE UP (ref 0–0.2)
BASOPHILS NFR BLD AUTO: 0.1 % — SIGNIFICANT CHANGE UP (ref 0–2)
BILIRUB SERPL-MCNC: 0.2 MG/DL — SIGNIFICANT CHANGE UP (ref 0.2–1.2)
BILIRUB UR-MCNC: NEGATIVE — SIGNIFICANT CHANGE UP
BLOOD GAS VENOUS COMPREHENSIVE RESULT: SIGNIFICANT CHANGE UP
BLOOD GAS VENOUS COMPREHENSIVE RESULT: SIGNIFICANT CHANGE UP
BUN SERPL-MCNC: 8 MG/DL — SIGNIFICANT CHANGE UP (ref 7–23)
CALCIUM SERPL-MCNC: 8.5 MG/DL — SIGNIFICANT CHANGE UP (ref 8.4–10.5)
CHLORIDE BLDV-SCNC: 103 MMOL/L — SIGNIFICANT CHANGE UP (ref 96–108)
CHLORIDE BLDV-SCNC: 107 MMOL/L — SIGNIFICANT CHANGE UP (ref 96–108)
CHLORIDE SERPL-SCNC: 102 MMOL/L — SIGNIFICANT CHANGE UP (ref 98–107)
CO2 BLDV-SCNC: 20.3 MMOL/L — LOW (ref 22–26)
CO2 BLDV-SCNC: 22.8 MMOL/L — SIGNIFICANT CHANGE UP (ref 22–26)
CO2 SERPL-SCNC: 20 MMOL/L — LOW (ref 22–31)
COLOR SPEC: YELLOW — SIGNIFICANT CHANGE UP
CREAT SERPL-MCNC: 0.5 MG/DL — SIGNIFICANT CHANGE UP (ref 0.5–1.3)
CRP SERPL-MCNC: 43.7 MG/L — HIGH
DIFF PNL FLD: NEGATIVE — SIGNIFICANT CHANGE UP
EGFR: 123 ML/MIN/1.73M2 — SIGNIFICANT CHANGE UP
EOSINOPHIL # BLD AUTO: 0.01 K/UL — SIGNIFICANT CHANGE UP (ref 0–0.5)
EOSINOPHIL NFR BLD AUTO: 0.1 % — SIGNIFICANT CHANGE UP (ref 0–6)
ERYTHROCYTE [SEDIMENTATION RATE] IN BLOOD: 15 MM/HR — SIGNIFICANT CHANGE UP (ref 4–25)
FLUAV AG NPH QL: SIGNIFICANT CHANGE UP
FLUBV AG NPH QL: SIGNIFICANT CHANGE UP
GAS PNL BLDV: 130 MMOL/L — LOW (ref 136–145)
GAS PNL BLDV: 135 MMOL/L — LOW (ref 136–145)
GLUCOSE BLDV-MCNC: 109 MG/DL — HIGH (ref 70–99)
GLUCOSE BLDV-MCNC: 83 MG/DL — SIGNIFICANT CHANGE UP (ref 70–99)
GLUCOSE SERPL-MCNC: 94 MG/DL — SIGNIFICANT CHANGE UP (ref 70–99)
GLUCOSE UR QL: NEGATIVE MG/DL — SIGNIFICANT CHANGE UP
HCO3 BLDV-SCNC: 20 MMOL/L — LOW (ref 22–29)
HCO3 BLDV-SCNC: 22 MMOL/L — SIGNIFICANT CHANGE UP (ref 22–29)
HCT VFR BLD CALC: 35.5 % — SIGNIFICANT CHANGE UP (ref 34.5–45)
HCT VFR BLDA CALC: 34 % — LOW (ref 34.5–46.5)
HCT VFR BLDA CALC: 38 % — SIGNIFICANT CHANGE UP (ref 34.5–46.5)
HGB BLD CALC-MCNC: 11.2 G/DL — LOW (ref 11.7–16.1)
HGB BLD CALC-MCNC: 12.8 G/DL — SIGNIFICANT CHANGE UP (ref 11.7–16.1)
HGB BLD-MCNC: 11.9 G/DL — SIGNIFICANT CHANGE UP (ref 11.5–15.5)
IANC: 13.53 K/UL — HIGH (ref 1.8–7.4)
IMM GRANULOCYTES NFR BLD AUTO: 0.4 % — SIGNIFICANT CHANGE UP (ref 0–0.9)
KETONES UR-MCNC: NEGATIVE MG/DL — SIGNIFICANT CHANGE UP
LACTATE BLDV-MCNC: 1 MMOL/L — SIGNIFICANT CHANGE UP (ref 0.5–2)
LACTATE BLDV-MCNC: 2.9 MMOL/L — HIGH (ref 0.5–2)
LEUKOCYTE ESTERASE UR-ACNC: NEGATIVE — SIGNIFICANT CHANGE UP
LYMPHOCYTES # BLD AUTO: 0.69 K/UL — LOW (ref 1–3.3)
LYMPHOCYTES # BLD AUTO: 4.6 % — LOW (ref 13–44)
MCHC RBC-ENTMCNC: 25.7 PG — LOW (ref 27–34)
MCHC RBC-ENTMCNC: 33.5 GM/DL — SIGNIFICANT CHANGE UP (ref 32–36)
MCV RBC AUTO: 76.7 FL — LOW (ref 80–100)
MONOCYTES # BLD AUTO: 0.69 K/UL — SIGNIFICANT CHANGE UP (ref 0–0.9)
MONOCYTES NFR BLD AUTO: 4.6 % — SIGNIFICANT CHANGE UP (ref 2–14)
NEUTROPHILS # BLD AUTO: 13.53 K/UL — HIGH (ref 1.8–7.4)
NEUTROPHILS NFR BLD AUTO: 90.2 % — HIGH (ref 43–77)
NITRITE UR-MCNC: NEGATIVE — SIGNIFICANT CHANGE UP
NRBC # BLD: 0 /100 WBCS — SIGNIFICANT CHANGE UP (ref 0–0)
NRBC # FLD: 0 K/UL — SIGNIFICANT CHANGE UP (ref 0–0)
PCO2 BLDV: 25 MMHG — LOW (ref 39–52)
PCO2 BLDV: 35 MMHG — LOW (ref 39–52)
PH BLDV: 7.4 — SIGNIFICANT CHANGE UP (ref 7.32–7.43)
PH BLDV: 7.5 — HIGH (ref 7.32–7.43)
PH UR: 5.5 — SIGNIFICANT CHANGE UP (ref 5–8)
PLATELET # BLD AUTO: 255 K/UL — SIGNIFICANT CHANGE UP (ref 150–400)
PO2 BLDV: 197 MMHG — HIGH (ref 25–45)
PO2 BLDV: 61 MMHG — HIGH (ref 25–45)
POTASSIUM BLDV-SCNC: 3.3 MMOL/L — LOW (ref 3.5–5.1)
POTASSIUM BLDV-SCNC: 4.3 MMOL/L — SIGNIFICANT CHANGE UP (ref 3.5–5.1)
POTASSIUM SERPL-MCNC: 4.5 MMOL/L — SIGNIFICANT CHANGE UP (ref 3.5–5.3)
POTASSIUM SERPL-SCNC: 4.5 MMOL/L — SIGNIFICANT CHANGE UP (ref 3.5–5.3)
PROT SERPL-MCNC: 7.3 G/DL — SIGNIFICANT CHANGE UP (ref 6–8.3)
PROT UR-MCNC: NEGATIVE MG/DL — SIGNIFICANT CHANGE UP
RBC # BLD: 4.63 M/UL — SIGNIFICANT CHANGE UP (ref 3.8–5.2)
RBC # FLD: 13.3 % — SIGNIFICANT CHANGE UP (ref 10.3–14.5)
RSV RNA NPH QL NAA+NON-PROBE: SIGNIFICANT CHANGE UP
SAO2 % BLDV: 92.3 % — HIGH (ref 67–88)
SAO2 % BLDV: 99.3 % — HIGH (ref 67–88)
SARS-COV-2 RNA SPEC QL NAA+PROBE: SIGNIFICANT CHANGE UP
SODIUM SERPL-SCNC: 135 MMOL/L — SIGNIFICANT CHANGE UP (ref 135–145)
SP GR SPEC: 1.02 — SIGNIFICANT CHANGE UP (ref 1–1.03)
UROBILINOGEN FLD QL: 0.2 MG/DL — SIGNIFICANT CHANGE UP (ref 0.2–1)
WBC # BLD: 15 K/UL — HIGH (ref 3.8–10.5)
WBC # FLD AUTO: 15 K/UL — HIGH (ref 3.8–10.5)

## 2024-03-08 PROCEDURE — 71260 CT THORAX DX C+: CPT | Mod: 26,MC

## 2024-03-08 PROCEDURE — 70450 CT HEAD/BRAIN W/O DYE: CPT | Mod: 26,MC

## 2024-03-08 PROCEDURE — 99285 EMERGENCY DEPT VISIT HI MDM: CPT

## 2024-03-08 PROCEDURE — 74177 CT ABD & PELVIS W/CONTRAST: CPT | Mod: 26,MC

## 2024-03-08 RX ORDER — SODIUM CHLORIDE 9 MG/ML
1000 INJECTION INTRAMUSCULAR; INTRAVENOUS; SUBCUTANEOUS ONCE
Refills: 0 | Status: COMPLETED | OUTPATIENT
Start: 2024-03-08 | End: 2024-03-08

## 2024-03-08 RX ORDER — VANCOMYCIN HCL 1 G
10 VIAL (EA) INTRAVENOUS ONCE
Refills: 0 | Status: DISCONTINUED | OUTPATIENT
Start: 2024-03-08 | End: 2024-03-08

## 2024-03-08 RX ORDER — ONDANSETRON 8 MG/1
4 TABLET, FILM COATED ORAL ONCE
Refills: 0 | Status: COMPLETED | OUTPATIENT
Start: 2024-03-08 | End: 2024-03-08

## 2024-03-08 RX ORDER — VANCOMYCIN HCL 1 G
1000 VIAL (EA) INTRAVENOUS ONCE
Refills: 0 | Status: COMPLETED | OUTPATIENT
Start: 2024-03-08 | End: 2024-03-08

## 2024-03-08 RX ORDER — PIPERACILLIN AND TAZOBACTAM 4; .5 G/20ML; G/20ML
3.38 INJECTION, POWDER, LYOPHILIZED, FOR SOLUTION INTRAVENOUS ONCE
Refills: 0 | Status: COMPLETED | OUTPATIENT
Start: 2024-03-08 | End: 2024-03-08

## 2024-03-08 RX ORDER — KETOROLAC TROMETHAMINE 30 MG/ML
30 SYRINGE (ML) INJECTION ONCE
Refills: 0 | Status: DISCONTINUED | OUTPATIENT
Start: 2024-03-08 | End: 2024-03-08

## 2024-03-08 RX ORDER — ACETAMINOPHEN 500 MG
1000 TABLET ORAL ONCE
Refills: 0 | Status: COMPLETED | OUTPATIENT
Start: 2024-03-08 | End: 2024-03-08

## 2024-03-08 RX ORDER — SODIUM CHLORIDE 9 MG/ML
1000 INJECTION INTRAMUSCULAR; INTRAVENOUS; SUBCUTANEOUS
Refills: 0 | Status: DISCONTINUED | OUTPATIENT
Start: 2024-03-08 | End: 2024-03-12

## 2024-03-08 RX ADMIN — ONDANSETRON 4 MILLIGRAM(S): 8 TABLET, FILM COATED ORAL at 18:22

## 2024-03-08 RX ADMIN — Medication 250 MILLIGRAM(S): at 20:13

## 2024-03-08 RX ADMIN — PIPERACILLIN AND TAZOBACTAM 200 GRAM(S): 4; .5 INJECTION, POWDER, LYOPHILIZED, FOR SOLUTION INTRAVENOUS at 18:22

## 2024-03-08 RX ADMIN — SODIUM CHLORIDE 100 MILLILITER(S): 9 INJECTION INTRAMUSCULAR; INTRAVENOUS; SUBCUTANEOUS at 23:22

## 2024-03-08 RX ADMIN — Medication 650 MILLIGRAM(S): at 03:03

## 2024-03-08 RX ADMIN — Medication 400 MILLIGRAM(S): at 18:21

## 2024-03-08 RX ADMIN — Medication 30 MILLIGRAM(S): at 20:29

## 2024-03-08 RX ADMIN — SODIUM CHLORIDE 1000 MILLILITER(S): 9 INJECTION INTRAMUSCULAR; INTRAVENOUS; SUBCUTANEOUS at 18:22

## 2024-03-08 RX ADMIN — SODIUM CHLORIDE 1000 MILLILITER(S): 9 INJECTION INTRAMUSCULAR; INTRAVENOUS; SUBCUTANEOUS at 20:01

## 2024-03-08 NOTE — ED PROVIDER NOTE - ATTENDING APP SHARED VISIT CONTRIBUTION OF CARE
Brief HPI:  39-year-old female past medical history of breast reduction revision with abdominal liposuction 12/8/2023 with Dr. Beatty presents with 1 day of chest and abdominal rash, headache, fever, myalgia, fatigue.  Patient reports red, not itching rash of the underside of her breast and upper abdomen.  Started today, feels it spreading.  Reports fever at home.  Headache is nonacute onset, not maximal in onset.  No recent travel or sick contacts.  Denies tobacco, alcohol, or illicit drug use.  Patient denies photophobia, neck stiffness or pain, cough, chest pain, shortness of breath, diarrhea, dysuria, flank pain.    Vitals:   Reviewed    Exam:    GEN:  Non-toxic appearing, non-distressed, speaking full sentences, non-diaphoretic, AAOx3  HEENT:  NCAT, neck supple, EOMI, PERRLA, sclera anicteric, no conjunctival pallor or injection, no stridor, normal voice, no tonsillar exudate, uvula midline  CV:  regular rhythm and rate, s1/s2 audible, no murmurs, rubs or gallops, peripheral pulses 2+ and symmetric  PULM:  non-labored respirations, lungs clear to auscultation bilaterally, no wheezes, crackles or rales  ABD:  non distended, non-tender, no rebound, no guarding, negative Miller's sign, bowel sounds normal, no cvat  MSK:  no gross deformity, non-tender extremities and joints, range of motion grossly normal appearing, no extremity edema, extremities warm and well perfused   NEURO:  AAOx3, CN II-XII intact, motor 5/5 in upper and lower extremities bilaterally, sensation grossly intact in extremities and trunk, finger to nose testing wnl, no nystagmus, negative Romberg, no pronator drift, no gait deficit  SKIN:  Blanching, erythematous, warm, nontender to extending from lower breast at surgical incision site, inferiorly to epigastric area, no crepitus no induration or warmth, no bulla    A/P:  39-year-old female past medical history of breast reduction revision with abdominal liposuction 12/8/2023 with Dr. Beatty presents with 1 day of chest and abdominal rash, headache, fever, myalgia, fatigue.  Tachycardic, febrile, SIRS positive.  No concern for meningitis at this time as patient without photophobia or nuchal rigidity.  Possible viral syndrome for cellulitis.  Low concern for necrotizing fasciitis given no purulence or bulla.  Will send labs, RVP, antibiotics, CT imaging, plastic surgery consult.  Disposition pending.

## 2024-03-08 NOTE — ED ADULT NURSE NOTE - OBJECTIVE STATEMENT
Pt received c/o rash to abdomen and chest, getting progressively worse over the last few hours. Red rash noted to  trunk, incision scars under b/l breasts appear irritated, possibly inflamed but closed at present. Pt had breast revision  surgery in December and had some complications post surgery. 20g IV placed in R AC, labs drawn as ordered. Report endorsed to BONNY Huerta.

## 2024-03-08 NOTE — ED ADULT TRIAGE NOTE - NS ED TRIAGE AVPU SCALE
Orthopedic Consult  Patient: Ritu Martino                                        YOB: 1939    Date of Admission: 6/30/2022  2:21 PM    Medical Record Number: [id number]    Attending Physician: Santiago Swift,*    Consulting Physician: Krystle Kenny MD    Reason for Consult: Left ankle fracture    History of Present Illness: 83 y.o. female admitted to [facility] with Hyponatremia [E87.1]  Multiple falls [R29.6]  Leukocytosis, unspecified type [D72.829]  Chest pain, unspecified type [R07.9]  Closed fracture of distal end of left fibula, unspecified fracture morphology, initial encounter [S82.676X].     The patient was evaluated in the emergency room and was diagnosed with ankle injury.   Secondary to the age and multiple medical comorbidities the patient was admitted to the hospitalist.   As I was on call for the emergency room I was consulted for further evaluation and treatment.   The patient was in the usual state of health and fell from standing height at home, Resulting in sudden onset ankle pain and inability to ambulate.   Denies any history of loss of consciousness, headache, vomiting, or seizures.   Denies any other injuries.   The patient is accompanied by her daughter family members  to this hospital visit.      Past medical history, past surgical history, family history ALLERGIES, and home medications have been reviewed by me.     Past Medical History:   Diagnosis Date   • Anemia 2000   • Anxiety    • Arthritis    • Bowel dysfunction 08/2021    since having surgery for diverticular infection   • Diverticulitis 08/2021    w/ rupture and infection required surgery and ostomy   • Essential hypertension, benign    • GERD (gastroesophageal reflux disease)    • Hernia, hiatal    • Hypercholesterolemia    • Hypokalemia    • Hyponatremia 2014    chronic low with occasional normal level   • Hypothyroidism 2018    estimated time frame pt nor  could remember  exactly how long has been on medication   • Insomnia    • Iron deficiency    • Seizures (HCC)      Past Surgical History:   Procedure Laterality Date   • APPENDECTOMY     • BACK SURGERY     •  SECTION     • COLON SURGERY  2021    to address ruptured and infected diverticular dz, ostomy also placed   • COLONOSCOPY     • COLOSTOMY CLOSURE  10/2021    ostomy removed   • EYE SURGERY  2 X cataract   • HEMORRHOIDECTOMY     • KNEE ARTHROPLASTY     • TONSILLECTOMY       Social History     Occupational History   • Not on file   Tobacco Use   • Smoking status: Former Smoker     Packs/day: 0.25     Years: 15.00     Pack years: 3.75     Types: Cigarettes     Start date: 1956     Quit date: 1971     Years since quittin.5   • Smokeless tobacco: Never Used   Vaping Use   • Vaping Use: Never used   Substance and Sexual Activity   • Alcohol use: Yes     Alcohol/week: 8.0 standard drinks     Types: 4 Glasses of wine, 4 Shots of liquor per week     Comment: occasional   • Drug use: No   • Sexual activity: Not Currently     Partners: Male      Social History     Social History Narrative   • Not on file     Family History   Problem Relation Age of Onset   • Brain cancer Mother    • Cancer Mother         Brain tumor,    • Stroke Father         Age 46   • Cancer Brother         pancreatic        No Known Allergies    Home Medications:  Medications Prior to Admission   Medication Sig Dispense Refill Last Dose   • acetaminophen (TYLENOL) 325 MG tablet Take 2 tablets by mouth Every 6 (Six) Hours As Needed for Mild Pain .      • ALPRAZolam (XANAX) 0.25 MG tablet Take 1 tablet by mouth 2 (Two) Times a Day. 60 tablet 3    • benazepril (LOTENSIN) 20 MG tablet Take 20 mg by mouth Daily.      • Cholecalciferol (VITAMIN D) 2000 units capsule Take  by mouth Daily With Dinner.      • clotrimazole-betamethasone (LOTRISONE) 1-0.05 % cream Apply  topically to the appropriate area as directed See Admin  Instructions. Apply topically to the affected area twice daily 45 g 3    • cycloSPORINE (RESTASIS) 0.05 % ophthalmic emulsion 1 drop 2 (Two) Times a Day.      • ferrous sulfate 325 (65 FE) MG tablet Take 325 mg by mouth 2 (Two) Times a Day Before Meals.      • hydrOXYzine (ATARAX) 10 MG tablet Take 1 tablet by mouth Every 8 (Eight) Hours As Needed for Anxiety (take 1-2 tablets). 40 tablet 0    • levETIRAcetam (KEPPRA) 500 MG tablet Take 500 mg by mouth 2 (Two) Times a Day.      • levothyroxine (SYNTHROID, LEVOTHROID) 50 MCG tablet Take 50 mcg by mouth Daily.      • predniSONE (DELTASONE) 10 MG tablet TAKE 4 TABLETS DAILY FOR 4 DAYS THEN 3 DAILY FOR 4 DAYS THEN 2 DAILY FOR 4 DAYS THEN 1 DAILY FOR 4 DAYS      • Probiotic Product (ALIGN PO) Take 1 capsule by mouth Daily With Lunch.      • Prucalopride Succinate (Motegrity) 1 MG tablet Take 1 mg by mouth.      • rosuvastatin (CRESTOR) 20 MG tablet Take 20 mg by mouth Every Night.      • sertraline (Zoloft) 50 MG tablet Take 1 tablet by mouth Daily. 30 tablet 3    • SODIUM CHLORIDE PO Take  by mouth 3 (Three) Times a Day.      • vitamin B-12 (CYANOCOBALAMIN) 100 MCG tablet Take 50 mcg by mouth Daily.      • zolpidem (AMBIEN) 5 MG tablet Take 5 mg by mouth every night at bedtime.          Current Medications:  Scheduled Meds:ALPRAZolam, 0.25 mg, Oral, BID  amLODIPine, 5 mg, Oral, Q24H  cholecalciferol, 2,000 Units, Oral, Daily With Dinner  cycloSPORINE, 1 drop, Both Eyes, BID  enoxaparin, 40 mg, Subcutaneous, Q24H  ferrous sulfate, 325 mg, Oral, BID AC  fluconazole, 100 mg, Oral, Q24H  lactobacillus acidophilus, 1 capsule, Oral, Daily With Lunch  levETIRAcetam, 500 mg, Oral, BID  levothyroxine, 50 mcg, Oral, Daily  potassium chloride, 20 mEq, Oral, BID With Meals  rosuvastatin, 20 mg, Oral, Nightly  vitamin B-12, 50 mcg, Oral, Daily      Continuous Infusions:   PRN Meds:.•  acetaminophen  •  HYDROcodone-acetaminophen  •  hydrOXYzine  •  melatonin  •  nitroglycerin  •   ondansetron **OR** ondansetron  •  [COMPLETED] Insert peripheral IV **AND** sodium chloride  •  zolpidem      Review of Systems:   A 12 point system review was reviewed with the patient and from the chart  and is negative except for as mentioned in the history.     Physical Exam: 83 y.o. female                    Vitals:    06/30/22 2305 07/01/22 0507 07/01/22 0720 07/01/22 1355   BP: 156/88  151/89 120/69   BP Location: Right arm  Right arm Right arm   Patient Position: Lying  Lying Lying   Pulse: 79  68 93   Resp: 16  17 18   Temp: 98.4 °F (36.9 °C)  97.8 °F (36.6 °C) 98.2 °F (36.8 °C)   TempSrc: Oral  Oral Oral   SpO2: 97%  97% 96%   Weight:  58.6 kg (129 lb 1.6 oz)     Height:            Gait: Could not be tested , patient is nonambulatory.    Mental/HEENT/cardio/skin: The patient's general appearance was well-nourished, well-hydrated, no acute distress.  Orientation was alert and oriented ×3.  The patient's mood was normal.   Pulmonary exam shows normal late exchange, no labored breathing, or shortness of breath.    The skin exam showed normal temperature and color in the area of examination.    Extremities: Left   lower extremity is in a posterior splint. Positive tenderness over the ankle, especially over the lateral malleolus.. The patient  is able to do gentle active range of motion of toes. Gross sensation is intact over the toes.    Pulses: Pulses could not be checked on the injured side Secondary to the splint. There is good capillary refill.     Diagnostic Tests:    Results from last 7 days   Lab Units 07/01/22  0837 06/30/22  1542 06/28/22  1607   WBC 10*3/mm3 10.89* 16.57* 15.77*   HEMOGLOBIN g/dL 13.2 12.6 13.5   HEMATOCRIT % 36.3 36.1 38.9   PLATELETS 10*3/mm3 327 345 459*     Results from last 7 days   Lab Units 07/01/22  1228 07/01/22  0837 07/01/22  0128 06/30/22  1914 06/30/22  1506   SODIUM mmol/L 120* 117* 121*   < > 117*   POTASSIUM mmol/L 3.6 3.3*  --   --  3.7   CHLORIDE mmol/L 86* 84*  --    --  83*   CO2 mmol/L 23.0 22.8  --   --  23.6   BUN mg/dL 11 7*  --   --  11   CREATININE mg/dL 0.58 0.67  --   --  0.60   GLUCOSE mg/dL 108* 127*  --   --  123*   CALCIUM mg/dL 8.4* 8.7  --   --  8.9    < > = values in this interval not displayed.         Lab Results   Component Value Date    URICACID 1.7 (L) 07/01/2022     No results found for: CRYSTAL  Microbiology Results (last 10 days)     Procedure Component Value - Date/Time    COVID PRE-OP / PRE-PROCEDURE SCREENING ORDER (NO ISOLATION) - Swab, Nasopharynx [[id number]]  (Normal) Collected: 06/30/22 1508    Lab Status: Final result Specimen: Swab from Nasopharynx Updated: 06/30/22 2047    Narrative:      The following orders were created for panel order COVID PRE-OP / PRE-PROCEDURE SCREENING ORDER (NO ISOLATION) - Swab, Nasopharynx.  Procedure                               Abnormality         Status                     ---------                               -----------         ------                     COVID-19,APTIMA PANTHER(...[[id number]]  Normal              Final result                 Please view results for these tests on the individual orders.    COVID-19,APTIMA PANTHER(MICHELLE),[facility], NP/OP SWAB IN UTM/VTM/SALINE TRANSPORT MEDIA,24 HR TAT - Swab, Nasopharynx [[id number]]  (Normal) Collected: 06/30/22 1508    Lab Status: Final result Specimen: Swab from Nasopharynx Updated: 06/30/22 2047     COVID19 Not Detected    Narrative:      Fact sheet for providers: https://www.fda.gov/media/573807/download     Fact sheet for patients: https://www.fda.gov/media/903668/download    Test performed by RT PCR.    NuSwab BV & Candida - , [[id number]] Collected: 06/27/22 1406    Lab Status: Final result Updated: 06/29/22 0607     Atopobium Vaginae Low - 0 Score      BVAB 2 Low - 0 Score      Megasphaera 1 Low - 0 Score      Comment: Calculate total score by adding the 3 individual bacterial  vaginosis (BV) marker scores together.  Total score is  interpreted as  follows:  Total score 0-1: Indicates the absence of BV.  Total score   2: Indeterminate for BV. Additional clinical                   data should be evaluated to establish a                   diagnosis.  Total score 3-6: Indicates the presence of BV.  This test was developed and its performance characteristics  determined by [facility].  It has not been cleared or approved  by the Food and Drug Administration.          Candida Albicans, MICHELLE Negative     Candida Glabrata, MICHELLE Negative    Narrative:      Test(s) 180056-Candida albicans, MICHELLE; 180057-Candida glabrata, MICHELLE  was developed and its performance characteristics determined  by [facility]. It has not been cleared or approved by the Food  and Drug Administration.  Performed at:  [address]  [id number]  : Heidy Duggan MD, Phone:  [phone]    Urine Culture - , [[id number]] Collected: 06/27/22 1314    Lab Status: Final result Updated: 06/29/22 0607     Urine Culture Final report     Result 1 Comment     Comment: Culture shows less than 10,000 colony forming units of bacteria per  milliliter of urine. This colony count is not generally considered  to be clinically significant.         Narrative:      Performed at:  [address]  [id number]  : Dick Pruitt PhD, Phone:  [phone]        XR Ankle 3+ View Left    Result Date: 6/30/2022   Distal fibular fracture.  This report was finalized on 6/30/2022 3:55 PM by Dr. Homer Maynard M.D.      XR Chest 1 View    Result Date: 6/30/2022  No focal pulmonary consolidation. Tortuous aorta. Follow-up as clinically indicated.  This report was finalized on 6/30/2022 3:55 PM by Dr. Homer Maynard M.D.      Assessment: Left ankle lateral malleolar ankle fracture.     Patient Active Problem List   Diagnosis   • Anxiety   • Hypothyroidism   • Hyponatremia   • Iron deficiency   • Hypokalemia   • Essential hypertension,  benign   • Fluent aphasia   • Hypochloremia   • Benzodiazepine dependence (HCC)   • Anemia   • Cervicalgia   • Intertrigo   • Insomnia   • Acute cystitis   • E coli infection   • Diverticulitis   • Arthritis   • Bowel dysfunction   • GERD (gastroesophageal reflux disease)   • Hernia, hiatal   • Seizures (HCC)   • Meningioma (HCC)   • Closed fracture of left distal fibula   • Debility   • History of seizure       Plan:  The patient is indicated for nonoperative management.  OK for WBAT for transfers with boot , do not gait train  Boot at all times, OK to remove for showers    Date: 7/1/2022    Krystle Kenny MD    CC: Gaurav Barger MD; MD Jamison Asif Stephen Matthew,*                 Alert-The patient is alert, awake and responds to voice. The patient is oriented to time, place, and person. The triage nurse is able to obtain subjective information.

## 2024-03-08 NOTE — ED PROVIDER NOTE - PROGRESS NOTE DETAILS
NICANOR Curry: Spoke with Dr. Ware regarding patient presentation. He will get in touch with patient. Also will try to send staff member to evaluate patient in ED. Patient plan of care ongoing. SUBHA:  Patient markedly febrile to 103.  Seen by plastic surgeon Dr. Beatty at bedside who agrees with plan for labs, ct, abx.  No recommendation for emergent surgical procedure at this time. SUBHA:  Patient rectally febrile to 103.  Seen by plastic surgeon Dr. Beatty at bedside who agrees with plan for labs, ct, abx.  No recommendation for emergent surgical procedure at this time. NICANOR Curry: Patient seen bedside, appears improved from initial presentation. Patient reports she feels improved after initiation of fluids, tylenol and zofran. Patient plan of care ongoing. Pending lab results and CT results NICANOR Curry: CBC significant for WBC elevated at 15. will order 2nd bolus NS and repeat lactate. Plan discussed with Dr. Chavez. NICANOR Curry: CBC significant for WBC elevated at 15. Will order 2nd bolus NS and repeat lactate. Rash increasing in size, now marked on chest and abdomen. Continue to remain on monitor. IV abx (vanc and zosyn) started.  Stable Hgb/hcg, UA negative for acute UTI, viral panel negative for acute viral illness, CT head: No hydrocephalus, acute intracranial hemorrhage, mass effect, or brain edema. Bilateral maxillary sinus air-fluid levels, correlate for the presence of acute sinusitis. CT chest, abdomen and pelvis: Mild skin thickening in the bilateral breasts is favored to be postsurgical in etiology given recent procedure. Correlate with physical exam findings to exclude a mild acute cellulitis. No underlying fluid collection or significant induration of the subcutaneous fat.  No acute visceral pathology in the chest, abdomen, or pelvis.   Plan discussed with Dr. Chavez and surgeon Dr. Ware. Will request consult from ID for further evaluation. Paged fellow on call. NICANOR Curry: Discussed plan of care with Dr. Chavez. Patient reports symptoms improving since arrival to ED. Low suspicion of meningitis in the setting of lack of photophobia or nuchal rigidity. In the setting of elevated white count, fever and tachycardia with unknown origin, ID consult placed for further evaluation, spoke with fellow EDNA Trotter to see patient in AM for further workup. Patient repeat vitals stable, to be admitted to medicine for further care. Patient to be admitted to medicine, accepting physician: Dr. Octavio Wilhelm.

## 2024-03-08 NOTE — ED PROVIDER NOTE - OBJECTIVE STATEMENT
40 yo F with PMH breast reduction 12/7/2023 with Jesus Alberto Pritchett, presenting to ED for evaluation of new onset rash on bilateral upper abdomen over breast reduction incision sites, and right flank. Patient also reports new onset fevers, chills, nausea, headache since this morning around 11am. She went to urgent care who sent patient to ED for further evaluation due to tachycardia, fever and rash.   Patient reports she woke up feeling like she was catching a cold, took zofran for nausea, went to class and felt ill around 11am, then noticed rash and presented to ED.   Reports URI and sinus infection approx 6 weeks ago, completed course of abx.   Reports new medication, herbal supplement, taking x 1 week.   Denies CP, SOB, diarrhea, urinary symotoms, abdominal pain. 38 yo F with PMH breast reduction 12/7/2023 with Jesus Alberto Pritchett, presenting to ED for evaluation of new onset rash on bilateral upper abdomen over breast reduction incision sites, and right flank. Patient also reports new onset fevers, chills, nausea, headache since this morning around 11am. She went to urgent care who sent patient to ED for further evaluation due to tachycardia, fever and rash. Patient reports negative flu and covid rapid test in urgent care.   Patient reports she woke up feeling like she was catching a cold, took zofran for nausea, went to class and felt ill around 11am, then noticed rash.   Reports URI and sinus infection approx 6 weeks ago, completed course of abx.   Denies CP, SOB, diarrhea, urinary symotoms, abdominal pain.

## 2024-03-08 NOTE — ED PROVIDER NOTE - CLINICAL SUMMARY MEDICAL DECISION MAKING FREE TEXT BOX
40 yo F with PMH breast reduction 12/7/2023 with Jesus Alberto Pritchett, presenting to ED for evaluation of new onset rash on bilateral upper abdomen over breast reduction incision sites, and right flank. Patient also reports new onset fevers, chills, nausea, headache since this morning around 11am. She went to urgent care who sent patient to ED for further evaluation due to tachycardia, fever and rash.   Patient reports she woke up feeling like she was catching a cold, took zofran for nausea, went to class and felt ill around 11am, then noticed rash and presented to ED.   Reports URI and sinus infection approx 6 weeks ago, completed course of abx.   Reports new medication, herbal supplement, taking x 1 week.   Denies CP, SOB, diarrhea, urinary symotoms, abdominal pain. 38 yo F with PMH breast reduction 12/7/2023 with Dr. Perez [facility], presenting to ED for evaluation of new onset rash on bilateral upper abdomen over breast reduction incision sites, and right flank. Patient also reports new onset fevers, chills, nausea, headache since this morning around 11am. She went to urgent care who sent patient to ED for further evaluation due to tachycardia, fever and rash. Patient reports negative flu and covid rapid test in urgent care.   Patient reports she woke up feeling like she was catching a cold, took zofran for nausea, went to class and felt ill around 11am, then noticed rash.   Reports URI and sinus infection approx 6 weeks ago, completed course of abx.   Denies CP, SOB, diarrhea, urinary symptoms, abdominal pain.    On exam, patient appears ill, A&O x 3, speaking in clear and coherent sentences.   Vitals - elevated BP, tachycardic, mildly febrile.   On PE - Lungs are clear bilaterally, tachycardic.     Will order - IV insert and fluids, tylenol for fever, labs: cbc + diff, cmp, venous blood gas. Zofran for nausea. Flu with covid NASEEM.   CT chest abd and pelvis with IV contrast to further evaluate abdominal pathology and assess for underlying potential abscess or abnormality.  Will contact surgeon, Dr. Perez. 40 yo F with PMH breast reduction 12/7/2023 with Dr. Perez [facility], presenting to ED for evaluation of new onset rash on bilateral upper abdomen over breast reduction incision sites, and right flank. Patient also reports new onset fevers, chills, nausea, headache since this morning around 11am. She went to urgent care who sent patient to ED for further evaluation due to tachycardia, fever and rash. Patient reports negative flu and covid rapid test in urgent care.   Patient reports she woke up feeling like she was catching a cold, took zofran for nausea, went to class and felt ill around 11am, then noticed rash.   Reports URI and sinus infection approx 6 weeks ago, completed course of abx.   Denies CP, SOB, diarrhea, urinary symptoms, abdominal pain.    On exam, patient appears ill, A&O x 3, speaking in clear and coherent sentences.   Vitals - elevated BP, tachycardic, mildly febrile.   On PE - Lungs are clear bilaterally, tachycardic. Red, warm, tender, and nonraised rash present in areas of incisions under bilateral breast, and right side of the abdomen.     Will order - EKG, IV insert and fluids, tylenol for fever, labs: cbc + diff, cmp, venous blood gas. Zofran for nausea. Flu with covid NASEEM.   CT chest abd and pelvis with IV contrast to further evaluate abdominal pathology and assess for underlying potential abscess or abnormality.  Will contact surgeon, Dr. Perez.

## 2024-03-08 NOTE — ED PROVIDER NOTE - PHYSICAL EXAMINATION
General: Well appearing, well nourished, in no distress. Oriented x 3, normal mood and affect .  Ambulating without difficulty.  Skin: Good turgor, no rash  HEENT:  Head: Normocephalic, atraumatic, no visible or palpable masses  Eyes: Conjunctiva clear, sclera non-icteric, EOM intact, PERRL  Ears: EACs clear, hearing intact.  Nose: No external lesions, mucosa non-inflamed  Neck: Supple, without lesions, bruits, or adenopathy  Heart: Tachycardic rate, normal rhythm, no murmur or gallop  Lungs: Clear to auscultation  Abdomen: No tenderness, masses, or hernia. Red, warm, tender, and nonraised rash present in areas of incisions of chest/abdominal wall under bilateral breast, and right side of the abdomen.   Back: Spine normal without deformity or tenderness, no CVA tenderness  Extremities: No amputations or deformities, cyanosis, edema or varicosities, peripheral pulses  intact  Musculoskeletal: Normal gait and station. No misalignment, asymmetry, crepitation, defects,  tenderness.  Psychiatric: Oriented X3, intact recent and remote memory, judgment and insight, normal mood  and affect.

## 2024-03-08 NOTE — ED ADULT TRIAGE NOTE - CHIEF COMPLAINT QUOTE
Patient c/o rash to right breast surgical site and abdomen, fevers and chills. Denies PHX. Patient had breast reduction done 2 months ago. Patient went to Urgent care today and was sent here for further evaluation. Breathing non-labored. Denies CP, no SOB noted. Denies nausea/vomiting. Patient noted tachycardic and mild fever in triage.

## 2024-03-09 DIAGNOSIS — R21 RASH AND OTHER NONSPECIFIC SKIN ERUPTION: ICD-10-CM

## 2024-03-09 DIAGNOSIS — J32.9 CHRONIC SINUSITIS, UNSPECIFIED: ICD-10-CM

## 2024-03-09 DIAGNOSIS — F39 UNSPECIFIED MOOD [AFFECTIVE] DISORDER: ICD-10-CM

## 2024-03-09 DIAGNOSIS — A41.9 SEPSIS, UNSPECIFIED ORGANISM: ICD-10-CM

## 2024-03-09 DIAGNOSIS — Z29.9 ENCOUNTER FOR PROPHYLACTIC MEASURES, UNSPECIFIED: ICD-10-CM

## 2024-03-09 DIAGNOSIS — L03.90 CELLULITIS, UNSPECIFIED: ICD-10-CM

## 2024-03-09 DIAGNOSIS — F32.89 OTHER SPECIFIED DEPRESSIVE EPISODES: ICD-10-CM

## 2024-03-09 LAB
A1C WITH ESTIMATED AVERAGE GLUCOSE RESULT: 5.2 % — SIGNIFICANT CHANGE UP (ref 4–5.6)
ADD ON TEST-SPECIMEN IN LAB: SIGNIFICANT CHANGE UP
ALBUMIN SERPL ELPH-MCNC: 3.4 G/DL — SIGNIFICANT CHANGE UP (ref 3.3–5)
ALP SERPL-CCNC: 45 U/L — SIGNIFICANT CHANGE UP (ref 40–120)
ALT FLD-CCNC: 13 U/L — SIGNIFICANT CHANGE UP (ref 4–33)
ANION GAP SERPL CALC-SCNC: 11 MMOL/L — SIGNIFICANT CHANGE UP (ref 7–14)
AST SERPL-CCNC: 15 U/L — SIGNIFICANT CHANGE UP (ref 4–32)
B BURGDOR C6 AB SER-ACNC: NEGATIVE — SIGNIFICANT CHANGE UP
B BURGDOR IGG+IGM SER-ACNC: 0.31 INDEX — SIGNIFICANT CHANGE UP (ref 0.01–0.89)
B PERT DNA SPEC QL NAA+PROBE: SIGNIFICANT CHANGE UP
B PERT+PARAPERT DNA PNL SPEC NAA+PROBE: SIGNIFICANT CHANGE UP
BASOPHILS # BLD AUTO: 0.02 K/UL — SIGNIFICANT CHANGE UP (ref 0–0.2)
BASOPHILS NFR BLD AUTO: 0.2 % — SIGNIFICANT CHANGE UP (ref 0–2)
BILIRUB SERPL-MCNC: 0.3 MG/DL — SIGNIFICANT CHANGE UP (ref 0.2–1.2)
BORDETELLA PARAPERTUSSIS (RAPRVP): SIGNIFICANT CHANGE UP
BUN SERPL-MCNC: 6 MG/DL — LOW (ref 7–23)
C PNEUM DNA SPEC QL NAA+PROBE: SIGNIFICANT CHANGE UP
CALCIUM SERPL-MCNC: 7.7 MG/DL — LOW (ref 8.4–10.5)
CHLORIDE SERPL-SCNC: 107 MMOL/L — SIGNIFICANT CHANGE UP (ref 98–107)
CHOLEST SERPL-MCNC: 157 MG/DL — SIGNIFICANT CHANGE UP
CMV IGM FLD-ACNC: <8 AU/ML — SIGNIFICANT CHANGE UP
CMV IGM SERPL QL: NEGATIVE — SIGNIFICANT CHANGE UP
CO2 SERPL-SCNC: 21 MMOL/L — LOW (ref 22–31)
CREAT SERPL-MCNC: 0.61 MG/DL — SIGNIFICANT CHANGE UP (ref 0.5–1.3)
CULTURE RESULTS: SIGNIFICANT CHANGE UP
EBV EA AB SER IA-ACNC: 14.9 U/ML — HIGH
EBV EA AB TITR SER IF: POSITIVE
EBV EA IGG SER-ACNC: POSITIVE
EBV NA IGG SER IA-ACNC: 41.5 U/ML — HIGH
EBV PATRN SPEC IB-IMP: SIGNIFICANT CHANGE UP
EBV VCA IGG AVIDITY SER QL IA: POSITIVE
EBV VCA IGM SER IA-ACNC: 301 U/ML — HIGH
EBV VCA IGM SER IA-ACNC: <10 U/ML — SIGNIFICANT CHANGE UP
EBV VCA IGM TITR FLD: NEGATIVE — SIGNIFICANT CHANGE UP
EGFR: 117 ML/MIN/1.73M2 — SIGNIFICANT CHANGE UP
EOSINOPHIL # BLD AUTO: 0.02 K/UL — SIGNIFICANT CHANGE UP (ref 0–0.5)
EOSINOPHIL NFR BLD AUTO: 0.2 % — SIGNIFICANT CHANGE UP (ref 0–6)
ESTIMATED AVERAGE GLUCOSE: 103 — SIGNIFICANT CHANGE UP
FLUAV SUBTYP SPEC NAA+PROBE: SIGNIFICANT CHANGE UP
FLUBV RNA SPEC QL NAA+PROBE: SIGNIFICANT CHANGE UP
GLUCOSE SERPL-MCNC: 103 MG/DL — HIGH (ref 70–99)
HADV DNA SPEC QL NAA+PROBE: SIGNIFICANT CHANGE UP
HCOV 229E RNA SPEC QL NAA+PROBE: SIGNIFICANT CHANGE UP
HCOV HKU1 RNA SPEC QL NAA+PROBE: SIGNIFICANT CHANGE UP
HCOV NL63 RNA SPEC QL NAA+PROBE: SIGNIFICANT CHANGE UP
HCOV OC43 RNA SPEC QL NAA+PROBE: SIGNIFICANT CHANGE UP
HCT VFR BLD CALC: 33.3 % — LOW (ref 34.5–45)
HDLC SERPL-MCNC: 46 MG/DL — LOW
HGB BLD-MCNC: 10.8 G/DL — LOW (ref 11.5–15.5)
HIV 1+2 AB+HIV1 P24 AG SERPL QL IA: SIGNIFICANT CHANGE UP
HMPV RNA SPEC QL NAA+PROBE: SIGNIFICANT CHANGE UP
HPIV1 RNA SPEC QL NAA+PROBE: SIGNIFICANT CHANGE UP
HPIV2 RNA SPEC QL NAA+PROBE: SIGNIFICANT CHANGE UP
HPIV3 RNA SPEC QL NAA+PROBE: SIGNIFICANT CHANGE UP
HPIV4 RNA SPEC QL NAA+PROBE: SIGNIFICANT CHANGE UP
IANC: 6.97 K/UL — SIGNIFICANT CHANGE UP (ref 1.8–7.4)
IMM GRANULOCYTES NFR BLD AUTO: 0.4 % — SIGNIFICANT CHANGE UP (ref 0–0.9)
LIPID PNL WITH DIRECT LDL SERPL: 99 MG/DL — SIGNIFICANT CHANGE UP
LYMPHOCYTES # BLD AUTO: 0.97 K/UL — LOW (ref 1–3.3)
LYMPHOCYTES # BLD AUTO: 11.5 % — LOW (ref 13–44)
M PNEUMO DNA SPEC QL NAA+PROBE: SIGNIFICANT CHANGE UP
MAGNESIUM SERPL-MCNC: 2 MG/DL — SIGNIFICANT CHANGE UP (ref 1.6–2.6)
MCHC RBC-ENTMCNC: 26 PG — LOW (ref 27–34)
MCHC RBC-ENTMCNC: 32.4 GM/DL — SIGNIFICANT CHANGE UP (ref 32–36)
MCV RBC AUTO: 80 FL — SIGNIFICANT CHANGE UP (ref 80–100)
MONOCYTES # BLD AUTO: 0.41 K/UL — SIGNIFICANT CHANGE UP (ref 0–0.9)
MONOCYTES NFR BLD AUTO: 4.9 % — SIGNIFICANT CHANGE UP (ref 2–14)
NEUTROPHILS # BLD AUTO: 6.97 K/UL — SIGNIFICANT CHANGE UP (ref 1.8–7.4)
NEUTROPHILS NFR BLD AUTO: 82.8 % — HIGH (ref 43–77)
NON HDL CHOLESTEROL: 111 MG/DL — SIGNIFICANT CHANGE UP
NRBC # BLD: 0 /100 WBCS — SIGNIFICANT CHANGE UP (ref 0–0)
NRBC # FLD: 0 K/UL — SIGNIFICANT CHANGE UP (ref 0–0)
PHOSPHATE SERPL-MCNC: 2.7 MG/DL — SIGNIFICANT CHANGE UP (ref 2.5–4.5)
PLATELET # BLD AUTO: 216 K/UL — SIGNIFICANT CHANGE UP (ref 150–400)
POTASSIUM SERPL-MCNC: 3.6 MMOL/L — SIGNIFICANT CHANGE UP (ref 3.5–5.3)
POTASSIUM SERPL-SCNC: 3.6 MMOL/L — SIGNIFICANT CHANGE UP (ref 3.5–5.3)
PROT SERPL-MCNC: 6.1 G/DL — SIGNIFICANT CHANGE UP (ref 6–8.3)
RAPID RVP RESULT: SIGNIFICANT CHANGE UP
RBC # BLD: 4.16 M/UL — SIGNIFICANT CHANGE UP (ref 3.8–5.2)
RBC # FLD: 13.4 % — SIGNIFICANT CHANGE UP (ref 10.3–14.5)
RSV RNA SPEC QL NAA+PROBE: SIGNIFICANT CHANGE UP
RV+EV RNA SPEC QL NAA+PROBE: SIGNIFICANT CHANGE UP
S PYO DNA THROAT QL NAA+PROBE: SIGNIFICANT CHANGE UP
SARS-COV-2 RNA SPEC QL NAA+PROBE: SIGNIFICANT CHANGE UP
SODIUM SERPL-SCNC: 139 MMOL/L — SIGNIFICANT CHANGE UP (ref 135–145)
SPECIMEN SOURCE: SIGNIFICANT CHANGE UP
TRIGL SERPL-MCNC: 58 MG/DL — SIGNIFICANT CHANGE UP
TSH SERPL-MCNC: 1.52 UIU/ML — SIGNIFICANT CHANGE UP (ref 0.27–4.2)
WBC # BLD: 8.42 K/UL — SIGNIFICANT CHANGE UP (ref 3.8–10.5)
WBC # FLD AUTO: 8.42 K/UL — SIGNIFICANT CHANGE UP (ref 3.8–10.5)

## 2024-03-09 PROCEDURE — 99223 1ST HOSP IP/OBS HIGH 75: CPT | Mod: GC

## 2024-03-09 PROCEDURE — 93010 ELECTROCARDIOGRAM REPORT: CPT

## 2024-03-09 PROCEDURE — 99222 1ST HOSP IP/OBS MODERATE 55: CPT

## 2024-03-09 RX ORDER — CEFAZOLIN SODIUM 1 G
VIAL (EA) INJECTION
Refills: 0 | Status: DISCONTINUED | OUTPATIENT
Start: 2024-03-09 | End: 2024-03-12

## 2024-03-09 RX ORDER — CEFAZOLIN SODIUM 1 G
1000 VIAL (EA) INJECTION EVERY 8 HOURS
Refills: 0 | Status: DISCONTINUED | OUTPATIENT
Start: 2024-03-09 | End: 2024-03-12

## 2024-03-09 RX ORDER — ESCITALOPRAM OXALATE 10 MG/1
1 TABLET, FILM COATED ORAL
Refills: 0 | DISCHARGE

## 2024-03-09 RX ORDER — CEFAZOLIN SODIUM 1 G
1000 VIAL (EA) INJECTION ONCE
Refills: 0 | Status: COMPLETED | OUTPATIENT
Start: 2024-03-09 | End: 2024-03-09

## 2024-03-09 RX ORDER — ACETAMINOPHEN 500 MG
1000 TABLET ORAL ONCE
Refills: 0 | Status: COMPLETED | OUTPATIENT
Start: 2024-03-09 | End: 2024-03-09

## 2024-03-09 RX ORDER — ONDANSETRON 8 MG/1
4 TABLET, FILM COATED ORAL ONCE
Refills: 0 | Status: COMPLETED | OUTPATIENT
Start: 2024-03-09 | End: 2024-03-09

## 2024-03-09 RX ORDER — ACETAMINOPHEN 500 MG
650 TABLET ORAL EVERY 6 HOURS
Refills: 0 | Status: DISCONTINUED | OUTPATIENT
Start: 2024-03-09 | End: 2024-03-12

## 2024-03-09 RX ORDER — ENOXAPARIN SODIUM 100 MG/ML
40 INJECTION SUBCUTANEOUS EVERY 24 HOURS
Refills: 0 | Status: DISCONTINUED | OUTPATIENT
Start: 2024-03-09 | End: 2024-03-12

## 2024-03-09 RX ORDER — ONDANSETRON 8 MG/1
4 TABLET, FILM COATED ORAL EVERY 8 HOURS
Refills: 0 | Status: DISCONTINUED | OUTPATIENT
Start: 2024-03-09 | End: 2024-03-09

## 2024-03-09 RX ORDER — ESCITALOPRAM OXALATE 10 MG/1
20 TABLET, FILM COATED ORAL DAILY
Refills: 0 | Status: DISCONTINUED | OUTPATIENT
Start: 2024-03-09 | End: 2024-03-12

## 2024-03-09 RX ORDER — PIPERACILLIN AND TAZOBACTAM 4; .5 G/20ML; G/20ML
3.38 INJECTION, POWDER, LYOPHILIZED, FOR SOLUTION INTRAVENOUS EVERY 8 HOURS
Refills: 0 | Status: DISCONTINUED | OUTPATIENT
Start: 2024-03-09 | End: 2024-03-09

## 2024-03-09 RX ORDER — LANOLIN ALCOHOL/MO/W.PET/CERES
3 CREAM (GRAM) TOPICAL AT BEDTIME
Refills: 0 | Status: DISCONTINUED | OUTPATIENT
Start: 2024-03-09 | End: 2024-03-12

## 2024-03-09 RX ADMIN — ESCITALOPRAM OXALATE 20 MILLIGRAM(S): 10 TABLET, FILM COATED ORAL at 13:16

## 2024-03-09 RX ADMIN — Medication 650 MILLIGRAM(S): at 02:03

## 2024-03-09 RX ADMIN — Medication 650 MILLIGRAM(S): at 21:45

## 2024-03-09 RX ADMIN — Medication 100 MILLIGRAM(S): at 21:34

## 2024-03-09 RX ADMIN — Medication 650 MILLIGRAM(S): at 22:40

## 2024-03-09 RX ADMIN — ONDANSETRON 4 MILLIGRAM(S): 8 TABLET, FILM COATED ORAL at 15:12

## 2024-03-09 RX ADMIN — PIPERACILLIN AND TAZOBACTAM 25 GRAM(S): 4; .5 INJECTION, POWDER, LYOPHILIZED, FOR SOLUTION INTRAVENOUS at 03:55

## 2024-03-09 RX ADMIN — Medication 650 MILLIGRAM(S): at 09:14

## 2024-03-09 RX ADMIN — Medication 400 MILLIGRAM(S): at 15:12

## 2024-03-09 RX ADMIN — Medication 1000 MILLIGRAM(S): at 15:52

## 2024-03-09 RX ADMIN — Medication 650 MILLIGRAM(S): at 08:14

## 2024-03-09 RX ADMIN — Medication 100 MILLIGRAM(S): at 11:03

## 2024-03-09 NOTE — PROGRESS NOTE ADULT - SUBJECTIVE AND OBJECTIVE BOX
Patient is a 39y old  Female who presents with a chief complaint of rash fever (09 Mar 2024 01:54)      ======Overnight/Subjective======  Overnight    Subjective    Brief daily plan    ======Medications======  MEDICATIONS  (STANDING):  enoxaparin Injectable 40 milliGRAM(s) SubCutaneous every 24 hours  piperacillin/tazobactam IVPB.. 3.375 Gram(s) IV Intermittent every 8 hours  sodium chloride 0.9%. 1000 milliLiter(s) (100 mL/Hr) IV Continuous <Continuous>    MEDICATIONS  (PRN):  acetaminophen     Tablet .. 650 milliGRAM(s) Oral every 6 hours PRN Temp greater or equal to 38C (100.4F), Mild Pain (1 - 3)  aluminum hydroxide/magnesium hydroxide/simethicone Suspension 30 milliLiter(s) Oral every 4 hours PRN Dyspepsia  melatonin 3 milliGRAM(s) Oral at bedtime PRN Insomnia  ondansetron Injectable 4 milliGRAM(s) IV Push every 8 hours PRN Nausea and/or Vomiting      ======Vital Signs======  T(C): 36.9 (24 @ 05:13), Max: 39.4 (24 @ 17:53)  T(F): 98.5 (24 @ 05:13), Max: 103 (24 @ 17:53)  HR: 85 (24 @ 05:13) (77 - 128)  BP: 101/60 (24 @ 05:13) (92/55 - 143/75)  BP(mean): 70 (24 @ 23:08) (70 - 70)  RR: 18 (24 @ 05:13) (18 - 19)  SpO2: 98% (24 @ 05:13) (97% - 100%)    ======Physical exams======  GENERAL: AAOx3, NAD  Cardiovascular: RRR, S1 S2, no m/r/g. No extremities edema, no JVD  LUNGS: Unlabored respirations, CTABL no wheeze/rhonchi/crackles  ABDOMEN: Soft, NTND, no rebound or guarding, BS presents. No CVA tenderness.  EXTREMITIES: Warm extremities, no clubbing, cyanosis, or edema, pulses 2+ bilaterally  NEURO: CN 2-12 grossly intact, strength 5/5 throughout, sensation intact    ======Labs======                11.9  15.00<H> >----------< 255  (MCV: 76.7<L>)                35.5   135 | 102 | 8  -----------------------< 94  4.5 | 20<L> | 0.50    TPro: 7.3 / Alb: 4.0 / TBili: 0.2 / DBili: -- / AlkPhos: 49 / ALT: 17 / AST: 37<H> (24 @ 17:21)  Ca: 8.5 / Phos: -- / Mg: -- (24 @ 17:21)    Gas: 7.40 / 35<L> / 61<H> / 22 / 92.3<H>% / -2.6<L> (24 @ 21:24)      ======Microbiology======  Urinalysis Basic - ( 08 Mar 2024 18:09 )    Color: Yellow / Appearance: Clear / S.020 / pH: x  Gluc: x / Ketone: Negative mg/dL  / Bili: Negative / Urobili: 0.2 mg/dL   Blood: x / Protein: Negative mg/dL / Nitrite: Negative   Leuk Esterase: Negative / RBC: x / WBC x   Sq Epi: x / Non Sq Epi: x / Bacteria: x          ======I&O's======  I&O's Summary       Patient is a 39y old  Female who presents with a chief complaint of rash fever (09 Mar 2024 01:54)      ======Overnight/Subjective======  Overnight  - No acute event overnight    Subjective  - Confirmed story in HPI  - No acute complaints, states rash is not itchy or painful. Endorse sinusitis symptoms.    Brief daily plan  - f/u ID recs    ======Medications======  MEDICATIONS  (STANDING):  enoxaparin Injectable 40 milliGRAM(s) SubCutaneous every 24 hours  piperacillin/tazobactam IVPB.. 3.375 Gram(s) IV Intermittent every 8 hours  sodium chloride 0.9%. 1000 milliLiter(s) (100 mL/Hr) IV Continuous <Continuous>    MEDICATIONS  (PRN):  acetaminophen     Tablet .. 650 milliGRAM(s) Oral every 6 hours PRN Temp greater or equal to 38C (100.4F), Mild Pain (1 - 3)  aluminum hydroxide/magnesium hydroxide/simethicone Suspension 30 milliLiter(s) Oral every 4 hours PRN Dyspepsia  melatonin 3 milliGRAM(s) Oral at bedtime PRN Insomnia  ondansetron Injectable 4 milliGRAM(s) IV Push every 8 hours PRN Nausea and/or Vomiting      ======Vital Signs======  T(C): 36.9 (24 @ 05:13), Max: 39.4 (24 @ 17:53)  T(F): 98.5 (24 @ 05:13), Max: 103 (24 @ 17:53)  HR: 85 (24 @ 05:13) (77 - 128)  BP: 101/60 (24 @ 05:13) (92/55 - 143/75)  BP(mean): 70 (24 @ 23:08) (70 - 70)  RR: 18 (24 @ 05:13) (18 - 19)  SpO2: 98% (24 @ 05:13) (97% - 100%)    ======Physical exams======  GENERAL: AAOx3, NAD  Cardiovascular: RRR, S1 S2, no m/r/g. No extremities edema, no JVD  LUNGS: Unlabored respirations, CTABL no wheeze/rhonchi/crackles  ABDOMEN: Soft, NTND, large raised rash seen on abdomen to lower breast, warm to touch, no tenderness to palpation  EXTREMITIES: Warm extremities, no clubbing, cyanosis, or edema, pulses 2+ bilaterally  NEURO: CN 2-12 grossly intact, strength 5/5 throughout, sensation intact    ======Labs======                11.9  15.00<H> >----------< 255  (MCV: 76.7<L>)                35.5   135 | 102 | 8  -----------------------< 94  4.5 | 20<L> | 0.50    TPro: 7.3 / Alb: 4.0 / TBili: 0.2 / DBili: -- / AlkPhos: 49 / ALT: 17 / AST: 37<H> (24 @ 17:21)  Ca: 8.5 / Phos: -- / Mg: -- (24 @ 17:21)    Gas: 7.40 / 35<L> / 61<H> / 22 / 92.3<H>% / -2.6<L> (24 @ 21:24)      ======Microbiology======  Urinalysis Basic - ( 08 Mar 2024 18:09 )    Color: Yellow / Appearance: Clear / S.020 / pH: x  Gluc: x / Ketone: Negative mg/dL  / Bili: Negative / Urobili: 0.2 mg/dL   Blood: x / Protein: Negative mg/dL / Nitrite: Negative   Leuk Esterase: Negative / RBC: x / WBC x   Sq Epi: x / Non Sq Epi: x / Bacteria: x          ======I&O's======  I&O's Summary

## 2024-03-09 NOTE — H&P ADULT - PROBLEM SELECTOR PLAN 1
Met SIRS, In the setting of elevated white count, fever and tachycardia with unknown origin, likely 2/2 URI infection with fever and rash     -s/p vanc and zosyn *1 in the ED.  -IVF  -consider Augmentin for possible sinusitis Met SIRS, In the setting of elevated white count, fever and tachycardia with unknown origin, likely 2/2 URI infection with fever and rash     -s/p vanc and zosyn *1 in the ED.  -f/u urine culture, blood culture result  -IVF  -consider Augmentin for possible sinusitis Met SIRS, In the setting of elevated white count, fever and tachycardia with unknown origin, likely 2/2 URI infection with fever and rash     -s/p vanc and zosyn *1 in the ED.  -f/u urine culture, blood culture result  -IVF  -c/w zosyn   -consider Augmentin for possible sinusitis  -ID was consulted Met SIRS, In the setting of elevated white count, fever and tachycardia with unknown origin, likely 2/2 URI infection with fever and rash     -Full RVP added  -s/p vanc and zosyn *1 in the ED.  -f/u urine culture, blood culture result  -IVF  -c/w zosyn   -consider Augmentin for possible sinusitis  -ID was consulted Met SIRS, In the setting of elevated white count, fever and tachycardia with unknown origin, possibly due to URI/sinusitis however also with new rash, possible cellulitis     -Full RVP added -> negative  -s/p vanc and zosyn *1 in the ED.  -f/u urine culture, blood culture result  -s/p 2L IVF in ED, c/w maintenance overngiht  -c/w zosyn for now; check MRSA screen  -ID was consulted by ED, to see this AM, f/u recs  -lactate wnl on repeat

## 2024-03-09 NOTE — H&P ADULT - NSHPPHYSICALEXAM_GEN_ALL_CORE
PHYSICAL EXAM    Vital Signs Last 24 Hrs  T(C): 36.8 (08 Mar 2024 23:57), Max: 39.4 (08 Mar 2024 17:53)  T(F): 98.3 (08 Mar 2024 23:57), Max: 103 (08 Mar 2024 17:53)  HR: 91 (08 Mar 2024 23:57) (77 - 128)  BP: 102/63 (08 Mar 2024 23:57) (92/55 - 143/75)  BP(mean): 70 (08 Mar 2024 23:08) (70 - 70)  RR: 18 (08 Mar 2024 23:57) (18 - 19)  SpO2: 98% (08 Mar 2024 23:57) (97% - 100%)    Parameters below as of 08 Mar 2024 22:20  Patient On (Oxygen Delivery Method): room air          GENERAL: NAD, lying comfortably in bed   HEAD:  frontal pressure tenderness  EYES: EOMI b/l, PERRLA b/l, conjunctiva and sclera clear  NECK: Supple, No JVD, No LAD   CHEST/LUNG: Clear to auscultation bilaterally; No wheeze or rhonchi  HEART: Regular rate and rhythm; S1 and S2 present, No murmurs, rubs, or gallops  ABDOMEN: Soft, Nontender, Nondistended; Bowel sounds present  EXTREMITIES:  2+ Peripheral Pulses, No clubbing, cyanosis, or edema  NEURO: AAOx3, non-focal   SKIN: macules diffuse rash noted extending from the mid chest to mid abd area, erythematous. non tender. PHYSICAL EXAM    Vital Signs Last 24 Hrs  T(C): 36.8 (08 Mar 2024 23:57), Max: 39.4 (08 Mar 2024 17:53)  T(F): 98.3 (08 Mar 2024 23:57), Max: 103 (08 Mar 2024 17:53)  HR: 91 (08 Mar 2024 23:57) (77 - 128)  BP: 102/63 (08 Mar 2024 23:57) (92/55 - 143/75)  BP(mean): 70 (08 Mar 2024 23:08) (70 - 70)  RR: 18 (08 Mar 2024 23:57) (18 - 19)  SpO2: 98% (08 Mar 2024 23:57) (97% - 100%)    Parameters below as of 08 Mar 2024 22:20  Patient On (Oxygen Delivery Method): room air          GENERAL: NAD, lying comfortably in bed   HEAD:  frontal sinuses TTP, normocephalic/atraumatic  EYES: EOMI b/l, PERRLA b/l, conjunctiva and sclera clear  NECK: Supple, No JVD, No LAD   CHEST/LUNG: Clear to auscultation bilaterally; No wheeze or rhonchi; good air movement; speaking in full sentences   HEART: Regular rate and rhythm; S1 and S2 present, No murmurs, rubs, or gallops  ABDOMEN: Soft, Nontender, Nondistended; Bowel sounds present  EXTREMITIES:  2+ Peripheral Pulses, No clubbing, cyanosis, or edema  NEURO: AAOx3, non-focal, negative Kernig/Brudzinski  SKIN: pink erythematous blanchable patches on b/l upper abdomen extending to lower breasts b/l; one small pink erythematous blanchable papule R upper torso (patient believes may be insertion site from surgery)

## 2024-03-09 NOTE — H&P ADULT - ASSESSMENT
40 yo F [facility] treated 6 weeks ago with 7 days course of amoxicillin presenting to ED for facial pain and evaluation of new onset rash on bilateral upper abdomen over breast reduction incision sites, and right flank.  38 yo F with PMH breast [facility] 12[facility]2023 [facility], acute sinusitis treated 6 weeks ago with 7 days course of amoxicillin presenting to ED for facial pain and evaluation of new onset rash on bilateral upper abdomen over breast reduction incision sites, and right flank, concerning for viral syndrome, ID was consulted in the ED.

## 2024-03-09 NOTE — H&P ADULT - NSHPREVIEWOFSYSTEMS_GEN_ALL_CORE
Review of Systems:  Constitutional: No fever, No weight loss, good appetite/po intake  Head: No headache   Eyes: No blurry vision, No diplopia  Neuro: No tremors, No muscle weakness   Cardiovascular: No chest pain, No palpitations  Respiratory: No SOB, No cough  GI: No nausea, No vomiting, No diarrhea  : No dysuria, No hematuria  Skin: No rash  MSK: No joint pain   Psych: No depression  Heme: No abnormal bruising, no abnormal bleeding REVIEW OF SYSTEMS:    CONSTITUTIONAL: No weakness, (+) fevers/chills  EYES/ENT: No visual changes; No dysphagia; No sore throat; No rhinorrhea; (+) sinus pain/pressure w/post nasal drip and malodorous smell  NECK: No pain or stiffness  RESPIRATORY: No cough, wheezing, hemoptysis; No shortness of breath  CARDIOVASCULAR: No chest pain or palpitations; No lower extremity edema  GASTROINTESTINAL: No abdominal or epigastric pain. No nausea, vomiting, or hematemesis; No diarrhea or constipation. No melena or hematochezia.  GENITOURINARY: No dysuria, frequency or hematuria  NEUROLOGICAL: No numbness, paresthesias, or weakness; (+) sinus HA; (+) LH/dizziness with standing  MSK: ambulates without aid; No falls  SKIN: No itching, burning; (+) anterior torso rash  All other review of systems is negative unless indicated above.

## 2024-03-09 NOTE — PROVIDER CONTACT NOTE (OTHER) - BACKGROUND
39 year old female admitted for fever and rash. History of seasonal allergies asthma, and cosmetic surgery.

## 2024-03-09 NOTE — CONSULT NOTE ADULT - SUBJECTIVE AND OBJECTIVE BOX
HPI:  40 yo F with PMH breast reduction 12/7/2023 with Dr. Perez, acute sinusitis treated 6 weeks ago with 7 days course of amoxicillin presenting to ED for facial pain and evaluation of new onset rash on bilateral upper abdomen over breast reduction incision sites, and right flank. Patient this am woke up with  new symptoms of new onset fevers, chills, nausea, headache. 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  She went to urgent care where flu and covid tests were negative. She denies CP, SOB, diarrhea, urinary symptoms abdominal pain. She denied new detergent, clothing, soap, or sheet. Of note, post breast reduction was c/b abscess which was eventually healed about 5 weeks ago. Her child from  was sick 5 days ago from cold like symptoms.     In the ED, CBC significant for WBC elevated at 15.Stable Hgb, UA negative for acute UTI, viral panel negative for acute viral illness, CT head: No hydrocephalus, acute intracranial hemorrhage, mass effect, or brain edema. Bilateral maxillary sinus air-fluid levels, correlate for the presence of acute sinusitis. CT chest, abdomen and pelvis: Mild skin thickening in the bilateral breasts is favored to be postsurgical in etiology given recent procedure. Correlate with physical exam findings to exclude a mild acute cellulitis. No acute visceral pathology in the chest, abdomen, or pelvis. Dr. Ware office was called, no recommendation for emergent surgical procedure at this time, only recommended labs, abx. she received fluids, tylenol and zofran. IV abx (vanc and zosyn) started. Rash increasing in size, now marked on chest and abdomen. ID was consulted. Patient reports symptoms improving since arrival to ED.  (09 Mar 2024 01:54)      Derm consulted 3/9 for hard rash on abdomen that spread to skin under breast. Per patient, rash present since early yesterday and spread rapidly from small pink spot on R lower abdomen to entire abdomen and breasts. Never had similar rash before, no involvement elsewhere on body. Endorses fevers/chills in association with rash. No recent travel or sick contacts. Had breast reduction surgery 3 mo ago, otherwise no procedures or injuries. Did not apply anything topically to the area. Feels it is improving since starting antibiotics yesterday.    In ED received vanc and zosyn, now on Cefazolin.    PAST MEDICAL & SURGICAL HISTORY:  Asthma      Seasonal allergies      Encounter for cosmetic surgery      H/O abdominoplasty      S/P bilateral breast reduction          REVIEW OF SYSTEMS  General: no fevers/chills, no NS	  Skin: see HPI  Ophthalmologic: no eye pain or change in vision  Genitourinary: no dysuria or hematuria  Musculoskeletal: no joint pains or weakness	  Neurological: no weakness or tingling    MEDICATIONS  (STANDING):  ceFAZolin   IVPB      ceFAZolin   IVPB 1000 milliGRAM(s) IV Intermittent every 8 hours  enoxaparin Injectable 40 milliGRAM(s) SubCutaneous every 24 hours  escitalopram 20 milliGRAM(s) Oral daily  sodium chloride 0.9%. 1000 milliLiter(s) (100 mL/Hr) IV Continuous <Continuous>    MEDICATIONS  (PRN):  acetaminophen     Tablet .. 650 milliGRAM(s) Oral every 6 hours PRN Temp greater or equal to 38C (100.4F), Mild Pain (1 - 3)  aluminum hydroxide/magnesium hydroxide/simethicone Suspension 30 milliLiter(s) Oral every 4 hours PRN Dyspepsia  melatonin 3 milliGRAM(s) Oral at bedtime PRN Insomnia      Allergies    watermelon /melons (Angioedema)  No Known Drug Allergies    Intolerances        SOCIAL HISTORY:    FAMILY HISTORY:  FH: hypertension (Mother)    FH: diabetes mellitus (Father)    FH: myocardial infarction (Mother)        Vital Signs Last 24 Hrs  T(C): 37.2 (09 Mar 2024 09:15), Max: 39.4 (08 Mar 2024 17:53)  T(F): 98.9 (09 Mar 2024 09:15), Max: 103 (08 Mar 2024 17:53)  HR: 87 (09 Mar 2024 09:15) (77 - 128)  BP: 106/56 (09 Mar 2024 09:15) (92/55 - 143/75)  BP(mean): 70 (08 Mar 2024 23:08) (70 - 70)  RR: 18 (09 Mar 2024 09:15) (18 - 19)  SpO2: 98% (09 Mar 2024 09:15) (97% - 100%)    Parameters below as of 09 Mar 2024 09:15  Patient On (Oxygen Delivery Method): room air        PHYSICAL EXAM:   The patient was alert and oriented and in no apparent distress.  There was no visible lymphadenopathy.  Conjunctiva were non injected  There was no clubbing or edema of extremities.    Of note on skin exam: pink edematous plaque on extending from inferior breasts to lower abdomen, improving compared to markings drawn on presentation yesterday; warm to touch, no weeping, blistering or edema    Bilateral scars on breasts and inframammary skin, healing well    LABS:                        10.8   8.42  )-----------( 216      ( 09 Mar 2024 06:50 )             33.3     03-09    139  |  107  |  6<L>  ----------------------------<  103<H>  3.6   |  21<L>  |  0.61    Ca    7.7<L>      09 Mar 2024 06:50  Phos  2.7     03-09  Mg     2.00     03-09    TPro  6.1  /  Alb  3.4  /  TBili  0.3  /  DBili  x   /  AST  15  /  ALT  13  /  AlkPhos  45  03-09      HIV Antigen/Antibody Screen by [facility] (03.09.24 @ 06:50)   HIV-1/2 Combo Result: Nonreact    Sedimentation Rate, Erythrocyte (03.08.24 @ 21:00)   Sedimentation Rate, Erythrocyte: 15 mm/hr    C-Reactive Protein, Serum (03.08.24 @ 21:00)   C-Reactive Protein, Serum: 43.7 mg/L    Blood Gas Venous - Lactate: 2.9: Elevated lactate      Urinalysis Basic - ( 09 Mar 2024 06:50 )    Color: x / Appearance: x / SG: x / pH: x  Gluc: 103 mg/dL / Ketone: x  / Bili: x / Urobili: x   Blood: x / Protein: x / Nitrite: x   Leuk Esterase: x / RBC: x / WBC x   Sq Epi: x / Non Sq Epi: x / Bacteria: x    < from: CT Abdomen and Pelvis w/ IV Cont (03.08.24 @ 19:41) >  IMPRESSION:  Mild skin thickening in the bilateral breasts is favored to be   postsurgical in etiology given recent procedure. Correlate with physical   exam findings to exclude a mild acute cellulitis. No underlying fluid   collection or significant induration of the subcutaneous fat.    No acute visceral pathology in the chest, abdomen, or pelvis.      < end of copied text > HPI:  40 yo F with PMH breast reduction 12/7/2023 with Dr. Perez, acute sinusitis treated 6 weeks ago with 7 days course of amoxicillin presenting to ED for facial pain and evaluation of new onset rash on bilateral upper abdomen over breast reduction incision sites, and right flank. Patient this am woke up with  new symptoms of new onset fevers, chills, nausea, headache. 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  She went to urgent care where flu and covid tests were negative. She denies CP, SOB, diarrhea, urinary symptoms abdominal pain. She denied new detergent, clothing, soap, or sheet. Of note, post breast reduction was c/b abscess which was eventually healed about 5 weeks ago. Her child from  was sick 5 days ago from cold like symptoms.     In the ED, CBC significant for WBC elevated at 15.Stable Hgb, UA negative for acute UTI, viral panel negative for acute viral illness, CT head: No hydrocephalus, acute intracranial hemorrhage, mass effect, or brain edema. Bilateral maxillary sinus air-fluid levels, correlate for the presence of acute sinusitis. CT chest, abdomen and pelvis: Mild skin thickening in the bilateral breasts is favored to be postsurgical in etiology given recent procedure. Correlate with physical exam findings to exclude a mild acute cellulitis. No acute visceral pathology in the chest, abdomen, or pelvis. Dr. Ware office was called, no recommendation for emergent surgical procedure at this time, only recommended labs, abx. she received fluids, tylenol and zofran. IV abx (vanc and zosyn) started. Rash increasing in size, now marked on chest and abdomen. ID was consulted. Patient reports symptoms improving since arrival to ED.  (09 Mar 2024 01:54)      Derm consulted 3/9 for hard rash on abdomen that spread to skin under breast. Per patient, rash present since early yesterday and spread rapidly from small pink spot on R lower abdomen to entire abdomen and breasts. Denies open lesion/cut or dry open skin in the area. Never had similar rash before, no involvement elsewhere on body. Endorses fevers/chills in association with rash. No recent travel or sick contacts. Had breast reduction surgery 3 mo ago, otherwise no procedures or injuries. Did not apply anything topically to the area. Feels it is improving since starting antibiotics yesterday.    In ED received vanc and zosyn, now on Cefazolin.    PAST MEDICAL & SURGICAL HISTORY:  Asthma      Seasonal allergies      Encounter for cosmetic surgery      H/O abdominoplasty      S/P bilateral breast reduction          REVIEW OF SYSTEMS  General: no fevers/chills, no NS	  Skin: see HPI  Ophthalmologic: no eye pain or change in vision  Genitourinary: no dysuria or hematuria  Musculoskeletal: no joint pains or weakness	  Neurological: no weakness or tingling    MEDICATIONS  (STANDING):  ceFAZolin   IVPB      ceFAZolin   IVPB 1000 milliGRAM(s) IV Intermittent every 8 hours  enoxaparin Injectable 40 milliGRAM(s) SubCutaneous every 24 hours  escitalopram 20 milliGRAM(s) Oral daily  sodium chloride 0.9%. 1000 milliLiter(s) (100 mL/Hr) IV Continuous <Continuous>    MEDICATIONS  (PRN):  acetaminophen     Tablet .. 650 milliGRAM(s) Oral every 6 hours PRN Temp greater or equal to 38C (100.4F), Mild Pain (1 - 3)  aluminum hydroxide/magnesium hydroxide/simethicone Suspension 30 milliLiter(s) Oral every 4 hours PRN Dyspepsia  melatonin 3 milliGRAM(s) Oral at bedtime PRN Insomnia      Allergies    watermelon /melons (Angioedema)  No Known Drug Allergies    Intolerances        SOCIAL HISTORY:    FAMILY HISTORY:  FH: hypertension (Mother)    FH: diabetes mellitus (Father)    FH: myocardial infarction (Mother)        Vital Signs Last 24 Hrs  T(C): 37.2 (09 Mar 2024 09:15), Max: 39.4 (08 Mar 2024 17:53)  T(F): 98.9 (09 Mar 2024 09:15), Max: 103 (08 Mar 2024 17:53)  HR: 87 (09 Mar 2024 09:15) (77 - 128)  BP: 106/56 (09 Mar 2024 09:15) (92/55 - 143/75)  BP(mean): 70 (08 Mar 2024 23:08) (70 - 70)  RR: 18 (09 Mar 2024 09:15) (18 - 19)  SpO2: 98% (09 Mar 2024 09:15) (97% - 100%)    Parameters below as of 09 Mar 2024 09:15  Patient On (Oxygen Delivery Method): room air        PHYSICAL EXAM:   The patient was alert and oriented and in no apparent distress.  There was no visible lymphadenopathy.  Conjunctiva were non injected  There was no clubbing or edema of extremities.    Of note on skin exam: pink edematous plaque on extending from inferior breasts to lower abdomen, improving compared to markings drawn on presentation yesterday; warm to touch, no weeping, blistering or edema    Bilateral scars on breasts and inframammary skin, healing well    LABS:                        10.8   8.42  )-----------( 216      ( 09 Mar 2024 06:50 )             33.3     03-09    139  |  107  |  6<L>  ----------------------------<  103<H>  3.6   |  21<L>  |  0.61    Ca    7.7<L>      09 Mar 2024 06:50  Phos  2.7     03-09  Mg     2.00     03-09    TPro  6.1  /  Alb  3.4  /  TBili  0.3  /  DBili  x   /  AST  15  /  ALT  13  /  AlkPhos  45  03-09      HIV Antigen/Antibody Screen by [facility] (03.09.24 @ 06:50)   HIV-1/2 Combo Result: Nonreact    Sedimentation Rate, Erythrocyte (03.08.24 @ 21:00)   Sedimentation Rate, Erythrocyte: 15 mm/hr    C-Reactive Protein, Serum (03.08.24 @ 21:00)   C-Reactive Protein, Serum: 43.7 mg/L    Blood Gas Venous - Lactate: 2.9: Elevated lactate          Urinalysis Basic - ( 09 Mar 2024 06:50 )    Color: x / Appearance: x / SG: x / pH: x  Gluc: 103 mg/dL / Ketone: x  / Bili: x / Urobili: x   Blood: x / Protein: x / Nitrite: x   Leuk Esterase: x / RBC: x / WBC x   Sq Epi: x / Non Sq Epi: x / Bacteria: x    < from: CT Abdomen and Pelvis w/ IV Cont (03.08.24 @ 19:41) >  IMPRESSION:  Mild skin thickening in the bilateral breasts is favored to be   postsurgical in etiology given recent procedure. Correlate with physical   exam findings to exclude a mild acute cellulitis. No underlying fluid   collection or significant induration of the subcutaneous fat.    No acute visceral pathology in the chest, abdomen, or pelvis.

## 2024-03-09 NOTE — H&P ADULT - PROBLEM SELECTOR PROBLEM 1
Karla Mercedes Abby, DO 3 hours ago (5:50 AM)     Hello, so I started having some blood in my stool, not I the actual stool but in the water or when I urinate that I questioned was a period on Friday. Now I have alot of pain when I have a bowel movement or even pass gas or use those muscles. What do I do?     Sepsis

## 2024-03-09 NOTE — H&P ADULT - NSHPLABSRESULTS_GEN_ALL_CORE
.  LABS:                         11.9   15.00 )-----------( 255      ( 08 Mar 2024 17:21 )             35.5     03-    135  |  102  |  8   ----------------------------<  94  4.5   |  20<L>  |  0.50    Ca    8.5      08 Mar 2024 17:21    TPro  7.3  /  Alb  4.0  /  TBili  0.2  /  DBili  x   /  AST  37<H>  /  ALT  17  /  AlkPhos  49  -      Urinalysis Basic - ( 08 Mar 2024 18:09 )    Color: Yellow / Appearance: Clear / S.020 / pH: x  Gluc: x / Ketone: Negative mg/dL  / Bili: Negative / Urobili: 0.2 mg/dL   Blood: x / Protein: Negative mg/dL / Nitrite: Negative   Leuk Esterase: Negative / RBC: x / WBC x   Sq Epi: x / Non Sq Epi: x / Bacteria: x        < from: CT Head No Cont (24 @ 19:41) >      IMPRESSION:    No hydrocephalus, acute intracranial hemorrhage, mass effect, or brain   edema.  Bilateral maxillary sinus air-fluid levels, correlate for the presence of   acute sinusitis.    --- End of Report ---    < end of copied text >    < from: CT Abdomen and Pelvis w/ IV Cont (24 @ 19:41) >    IMPRESSION:  Mild skin thickening in the bilateral breasts is favored to be   postsurgical in etiology given recent procedure. Correlate with physical   exam findings to exclude a mild acute cellulitis. No underlying fluid   collection or significant induration of the subcutaneous fat.    No acute visceral pathology in the chest, abdomen, or pelvis.    --- End of Report ---    < end of copied text > .  LABS:                         11.9   15.00 )-----------( 255      ( 08 Mar 2024 17:21 )             35.5     03-08    135  |  102  |  8   ----------------------------<  94  4.5   |  20<L>  |  0.50    Ca    8.5      08 Mar 2024 17:21    TPro  7.3  /  Alb  4.0  /  TBili  0.2  /  DBili  x   /  AST  37<H>  /  ALT  17  /  AlkPhos  49  03-08    Sedimentation Rate, Erythrocyte: 15 mm/hr (24 @ 21:00)  C-Reactive Protein, Serum: 43.7 mg/L (24 @ 21:00)    21:24 - VBG - pH: 7.40  | pCO2: 35    | pO2: 61    | Lactate: 1.0    17:21 - VBG - pH: 7.50  | pCO2: 25    | pO2: 197   | Lactate: 2.9      Urinalysis Basic - ( 08 Mar 2024 18:09 )  Color: Yellow / Appearance: Clear / S.020 / pH: 5.5  Gluc: Negative mg/dL / Ketone: Negative mg/dL  / Bili: Negative / Urobili: 0.2 mg/dL   Blood: Negative / Protein: Negative mg/dL / Nitrite: Negative   Leuk Esterase: Negative / RBC: x / WBC x   Sq Epi: x / Non Sq Epi: x / Bacteria: x    Rapid RVP Result: NotDetec (24 @ 17:21)  SARS-CoV-2: NotDetec (24 @ 17:21)    < from: CT Head No Cont (24 @ 19:41) >  No hydrocephalus, mass effect, midline shift, acute intracranial hemorrhage, or brain edema. Bilateral maxillary sinus air-fluid levels. Right sphenoid and bilateral ethmoid sinus mucosal thickening. Mastoid air cells clear.  IMPRESSION: No hydrocephalus, acute intracranial hemorrhage, mass effect, or brain edema. Bilateral maxillary sinus air-fluid levels, correlate for the presence of acute sinusitis.  < end of copied text >    < from: CT Chest w/ IV Cont (24 @ 19:41) >  CHEST:  LUNGS AND LARGE AIRWAYS: Patent central airways. 2 mm right upper lobe nodule (2-20). Bibasilar dependent atelectasis.  PLEURA: No pleural effusion.  VESSELS: Within normal limits.  HEART: Heart size is normal. No pericardial effusion.  MEDIASTINUM AND KOMAL: No lymphadenopathy.  CHEST WALL AND LOWER NECK: Mild skin thickening of the bilateral breasts is favored to be postsurgical in etiology given recent procedure. No underlying fluid collection.  ABDOMEN AND PELVIS:  LIVER: Within normal limits.  BILE DUCTS: Normal caliber.  GALLBLADDER: Within normal limits.  SPLEEN: Within normal limits.  PANCREAS: Within normal limits.  ADRENALS: Within normal limits.  KIDNEYS/URETERS: Kidneys enhance symmetrically. No hydronephrosis. No obstructing nephrolithiasis.  BLADDER: Within normal limits.  REPRODUCTIVE ORGANS: Uterus and adnexa within normal limits.  BOWEL: No bowel obstruction. Appendix is normal.  PERITONEUM: No ascites.  VESSELS: Within normal limits.  RETROPERITONEUM/LYMPH NODES: No lymphadenopathy.  ABDOMINAL WALL: Postsurgical changes in the anterior abdominal wall.  BONES: Within normal limits.  IMPRESSION: Mild skin thickening in the bilateral breasts is favored to be postsurgical in etiology given recent procedure. Correlate with physical exam findings to exclude a mild acute cellulitis. No underlying fluid collection or significant induration of the subcutaneous fat. No acute visceral pathology in the chest, abdomen, or pelvis.  < end of copied text >    EKG personally reviewed and interpreted - ST 124bpm, QTc 583ms

## 2024-03-09 NOTE — CONSULT NOTE ADULT - ASSESSMENT
NOTE INCOMPLETE    ASSESSMENT/PLAN:    # Rash on abdomen, improving  - Favor cellulitis; hardness is likely edema of inflamed skin  - Continue Cefazolin for now, follow-up MRSA PCR and adjust antibiotics accordingly      Thank you for this consult. Case seen and reviewed with attending dermatologist Dr. Sharif    Dermatology will sign off. Please page [phone] with a 10-digit call-back number for further related questions.    Abbey Rico MD  Resident Physician, PGY-2   [facility] Dermatology   Pager: [phone] ASSESSMENT/PLAN:    # Cellulitis  - History and exam findings consistent with cellulitis  - Continue Cefazolin for now, follow-up MRSA PCR and adjust antibiotics accordingly  - No topical therapies or additional interventions recommended from Dermatology standpoint      Thank you for this consult. Case seen and reviewed with attending dermatologist Dr. Sharif    Dermatology will sign off. Please page [phone] with a 10-digit call-back number for further related questions.    Abbey Rico MD  Resident Physician, PGY-2   [facility] Dermatology   Pager: [phone]

## 2024-03-09 NOTE — PATIENT PROFILE ADULT - NSPROPTRIGHTNOTIFY_GEN_A_NUR
Problem: Adult Inpatient Plan of Care  Goal: Plan of Care Review  Outcome: Ongoing (interventions implemented as appropriate)  Patient tolerated treatment without any complications. PAC NSL and deacessed. D/C VS taken and within normal range. Patient aware of next appt date and time. Patient leaving with daughter via wheelchair         declines

## 2024-03-09 NOTE — H&P ADULT - ATTENDING COMMENTS
39F w/hx recurrent sinusitis, recent treatment w/abx 6 weeks ago with resolution of sx, history of asthma (rare use of rescue inhaler), depression, presenting with 1d fevers, chills, sinus HA, new onset of rash on anterior upper torso started right upper abdomen extended to left and onto lower breasts b/l (from ~4-8 o'clock). Of note patient with recent breast reduction and liposuction on 12/7/23. She was seen by plastic sx in ED (see ED progress notes). ID consulted by ED -> f/u recs. Would also consult derm this AM. Patient denies any travel, only sick contact is her daughter with BRYAN sx (reports small rash on cheek attributed to dryness from wiping her nose). Denies arthropod bites. Only new medication is supplement berberine that she started taking a few days ago. 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 She denies any prior history of cellulitis. The eruption is neither painful nor pruritic, no visible vesicles. She denies any new use of beauty products, lotions, fragrances, new bras, body soaps, detergents. Would consult derm as well this AM for input. QT on presenting EKG prolonged, repeat this AM

## 2024-03-09 NOTE — CONSULT NOTE ADULT - ASSESSMENT
39 F PMH OCD on SSRI, breast reduction Dec 7th 2023, post op course complicated by multiple incisional wounds that took weeks to heal (now healed), presenting to the ED with upper abdominal rash extending up to breast reduction incision sites.     Associated with fevers, Sudden onset. No pain on palpation, warm to touch  In ED patient T max 103, WBC 15  CT C/A/P did not reveal any abscess, showed mild skin thickening in the bilateral breasts favored to be post surgical  On exam abd wall erythema and warmth, extending upto b/l inferior breast incisions     Overall;  Cellulitis, SSTI    Suggest;  De-escalate Zosyn to Ancef 1g IV Q8h   Monitor cellulitis  F/u pending ID workup including blood cx  HIV screen negative   Further recommendations pending clinical course     Ottoniel Kendrick MD, PGY-5   ID Fellow  [facility] Teams Preferred  After 5pm/weekends call [phone]

## 2024-03-09 NOTE — PROGRESS NOTE ADULT - PROBLEM SELECTOR PLAN 1
Met SIRS, In the setting of elevated white count, fever and tachycardia with unknown origin, likely 2/2 URI infection with fever and rash     Plan:  - s/p vanc and zosyn x 1 in ED  - c/w zosyn  - f/u full RVP  - f/u Ucx, Bcx  - f/u ID recs Few days of rash over abdomen and lower breast, hard and warm to touch, otherwise no tenderness or itchy    Plan:  - Likely cellulitis  - ID recs appreciated  - will start Ancef 1g q8h and monitor for resolution  - f/u EBV, CMV, HIV, Mycoplasma, Lyme, parvo 19,strep, ciro mountain spotted fever

## 2024-03-09 NOTE — H&P ADULT - HISTORY OF PRESENT ILLNESS
40 yo F with PMH breast reduction 12/7/2023 with Dr. Perez [facility], acute sinusitis treated 6 weeks ago with 7 days course of amoxicillin presenting to ED for facial pain and evaluation of new onset rash on bilateral upper abdomen over breast reduction incision sites, and right flank. Since the course of abx, Patient this am woke up with  new symptoms of s new onset fevers, chills, nausea, headache. took zofran and then She went to class, notice some tenderness under the breast, upon inspection, she noticed the new onset of rash as well since then it spread to the upper abdominal area.  She went to urgent care where flu and covid tests were negative negative. she denies CP, SOB, diarrhea, urinary symotoms, abdominal pain. Of note, post breast reduction was c/b absesses which was eventually healed about 5 weeks ago. her child from  was sick 5 days ago from cold like symptoms.     In the ED, CBC significant for WBC elevated at 15.Stable Hgb/hcg UA negative for acute UTI, viral panel negative for acute viral illness, CT head: No hydrocephalus, acute intracranial hemorrhage, mass effect, or brain edema. Bilateral maxillary sinus air-fluid levels, correlate for the presence of acute sinusitis.CT chest, abdomen and pelvis: Mild skin thickening in the bilateral breasts is favored to be postsurgical in etiology given recent procedure. Correlate with physical exam findings to exclude a mild acute cellulitis. No acute visceral pathology in the chest, abdomen, or pelvis. Dr. Ware office was called, no recommendation for emergent surgical procedure at this time, only recommended labs, ct, abx. she received fluids, tylenol and zofran. IV abx (vanc and zosyn) started. Rash increasing in size, now marked on chest and abdomen. ID was consulted. Patient reports symptoms improving since arrival to ED.  40 yo F with PMH breast reduction 12/7/2023 with Dr. Perez [facility], acute sinusitis treated 6 weeks ago with 7 days course of amoxicillin presenting to ED for facial pain and evaluation of new onset rash on bilateral upper abdomen over breast reduction incision sites, and right flank. Since the course of abx, Patient this am woke up with  new symptoms of s new onset fevers, chills, nausea, headache. took zofran and then She went to class, notice some tenderness under the breast, upon inspection, she noticed the new onset of rash as well since then it spread to the upper abdominal area.  She went to urgent care where flu and covid tests were negative negative. she denies CP, SOB, diarrhea, urinary symptoms abdominal pain. she denied new detergent, clothing, soap, or sheet. Of note, post breast reduction was c/b abscess which was eventually healed about 5 weeks ago. her child from  was sick 5 days ago from cold like symptoms.     In the ED, CBC significant for WBC elevated at 15.Stable Hgb/hcg UA negative for acute UTI, viral panel negative for acute viral illness, CT head: No hydrocephalus, acute intracranial hemorrhage, mass effect, or brain edema. Bilateral maxillary sinus air-fluid levels, correlate for the presence of acute sinusitis. CT chest, abdomen and pelvis: Mild skin thickening in the bilateral breasts is favored to be postsurgical in etiology given recent procedure. Correlate with physical exam findings to exclude a mild acute cellulitis. No acute visceral pathology in the chest, abdomen, or pelvis. Dr. Ware office was called, no recommendation for emergent surgical procedure at this time, only recommended labs, ct, abx. she received fluids, tylenol and zofran. IV abx (vanc and zosyn) started. Rash increasing in size, now marked on chest and abdomen. ID was consulted. Patient reports symptoms improving since arrival to ED.  38 yo F with PMH breast reduction 12/7/2023 with Dr. Perez, acute sinusitis treated 6 weeks ago with 7 days course of amoxicillin presenting to ED for facial pain and evaluation of new onset rash on bilateral upper abdomen over breast reduction incision sites, and right flank. Patient this am woke up with  new symptoms of new onset fevers, chills, nausea, headache. 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  She went to urgent care where flu and covid tests were negative. She denies CP, SOB, diarrhea, urinary symptoms abdominal pain. She denied new detergent, clothing, soap, or sheet. Of note, post breast reduction was c/b abscess which was eventually healed about 5 weeks ago. Her child from  was sick 5 days ago from cold like symptoms.     In the ED, CBC significant for WBC elevated at 15.Stable Hgb, UA negative for acute UTI, viral panel negative for acute viral illness, CT head: No hydrocephalus, acute intracranial hemorrhage, mass effect, or brain edema. Bilateral maxillary sinus air-fluid levels, correlate for the presence of acute sinusitis. CT chest, abdomen and pelvis: Mild skin thickening in the bilateral breasts is favored to be postsurgical in etiology given recent procedure. Correlate with physical exam findings to exclude a mild acute cellulitis. No acute visceral pathology in the chest, abdomen, or pelvis. Dr. Ware office was called, no recommendation for emergent surgical procedure at this time, only recommended labs, abx. she received fluids, tylenol and zofran. IV abx (vanc and zosyn) started. Rash increasing in size, now marked on chest and abdomen. ID was consulted. Patient reports symptoms improving since arrival to ED.

## 2024-03-09 NOTE — H&P ADULT - NSHPSOCIALHISTORY_GEN_ALL_CORE
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

## 2024-03-09 NOTE — PROGRESS NOTE ADULT - PROBLEM SELECTOR PLAN 2
Low suspicion of meningitis in the setting of lack of photophobia or nuchal rigidity, could be allergic sinusitis, or bacterial sinusitis given the fever, maxillary pain, and sense of bad odor.     Plan:  - f/u full RVP  - Sepsis work up as above  - Can consider narrowing to Augmentin for bacterial sinusitis, patient reports had good response in past  - f/u ID recs Low suspicion of meningitis in the setting of lack of photophobia or nuchal rigidity, could be allergic sinusitis, or bacterial sinusitis given the fever, maxillary pain, and sense of bad odor.     Plan:  - f/u full RVP  - Sepsis work up as below  - Likely 2/2 cellulitis +/- bacterial sinusiti

## 2024-03-09 NOTE — PROVIDER CONTACT NOTE (OTHER) - ACTION/TREATMENT ORDERED:
MD Aldair Gill made aware. IV Tylenol ordered.
MD Mumtaz Ogden made aware. No new interventions ordered at this time.

## 2024-03-09 NOTE — PROGRESS NOTE ADULT - ASSESSMENT
38 yo F [facility] sinusitis treated 6 weeks ago with 7 days course of amoxicillin presenting to ED for facial pain and evaluation of new onset rash on bilateral upper abdomen over breast reduction incision sites, and right flank, concerning for viral syndrome, ID was consulted in the ED.  40 yo F with PMH breast reduction 12/7/2023 with Dr. Perez [facility], acute sinusitis treated 6 weeks ago with 7 days course of amoxicillin presenting to ED for facial pain and evaluation of new onset rash on bilateral upper abdomen over breast reduction incision sites, and right flank, likely cellulitis

## 2024-03-09 NOTE — CONSULT NOTE ADULT - SUBJECTIVE AND OBJECTIVE BOX
Patient is a 39 Female who presents with a chief complaint of rash    HPI:  39 F PMH OCD on SSRI, breast reduction Dec 7th 2023, post op course complicated by multiple incisional wounds that took weeks to heal (now healed), presenting to the ED with upper abdominal rash extending up to breast reduction incision sites.     Associated with fevers, Sudden onset. No pain on palpation, warm to touch. In ED patient T max 103, WBC 15. CT C/A/P did not reveal any abscess, showed mild skin thickening in the bilateral breasts favored to be post surgical. Started on abx and ID consulted for recommendations.     REVIEW OF SYSTEMS  + Fevers  + Rash abdomen  No HA  No AMS  No CP  No SOB  No abd pain  No diarrhea  No dysuria   No weakness     prior hospital charts reviewed [V]  primary team notes reviewed [V]  other consultant notes reviewed [V]    PAST MEDICAL & SURGICAL HISTORY:  Asthma    Seasonal allergies    Encounter for cosmetic surgery    H/O abdominoplasty    S/P bilateral breast reduction    SOCIAL HISTORY:  Currently not working  Pet dog at home  No known sick contacts     FAMILY HISTORY:  FH: hypertension (Mother)    FH: diabetes mellitus (Father)    FH: myocardial infarction (Mother)    Allergies  watermelon /melons (Angioedema)  No Known Drug Allergies    ANTIMICROBIALS:  piperacillin/tazobactam IVPB.. 3.375 every 8 hours    ANTIMICROBIALS (past 90 days):  MEDICATIONS  (STANDING):  piperacillin/tazobactam IVPB..   25 mL/Hr IV Intermittent (03-09-24 @ 03:55)    piperacillin/tazobactam IVPB...   200 mL/Hr IV Intermittent (03-08-24 @ 18:22)    vancomycin  IVPB.   250 mL/Hr IV Intermittent (03-08-24 @ 20:13)    OTHER MEDS:   MEDICATIONS  (STANDING):  acetaminophen     Tablet .. 650 every 6 hours PRN  aluminum hydroxide/magnesium hydroxide/simethicone Suspension 30 every 4 hours PRN  enoxaparin Injectable 40 every 24 hours  melatonin 3 at bedtime PRN  ondansetron Injectable 4 every 8 hours PRN    VITALS:  Vital Signs Last 24 Hrs  T(F): 98.5 (03-09-24 @ 05:13), Max: 103 (03-08-24 @ 17:53)    Vital Signs Last 24 Hrs  HR: 85 (03-09-24 @ 05:13) (77 - 128)  BP: 101/60 (03-09-24 @ 05:13) (92/55 - 143/75)  RR: 18 (03-09-24 @ 05:13)  SpO2: 98% (03-09-24 @ 05:13) (97% - 100%)  Wt(kg): --    EXAM:  General: Patient in no acute distress   HEENT: NCAT, EOMI  CV: S1+S2  Lungs: No respiratory distress, CTAB  Abd: Soft, nontender  Abd wall erythema and warmth, extending upto b/l inferior breast incisions   Ext: No cyanosis, no edema  Neuro: Alert and oriented, no focal deficits  Skin: See Abd section   IV: No phlebitis    Labs:                        10.8   8.42  )-----------( 216      ( 09 Mar 2024 06:50 )             33.3     03-09    139  |  107  |  6<L>  ----------------------------<  103<H>  3.6   |  21<L>  |  0.61    Ca    7.7<L>      09 Mar 2024 06:50  Phos  2.7     03-09  Mg     2.00     03-09    TPro  6.1  /  Alb  3.4  /  TBili  0.3  /  DBili  x   /  AST  15  /  ALT  13  /  AlkPhos  45  03-09    WBC Trend:  WBC Count: 8.42 (03-09-24 @ 06:50)  WBC Count: 15.00 (03-08-24 @ 17:21)    Auto Neutrophil #: 6.97 K/uL (03-09-24 @ 06:50)  Auto Neutrophil #: 13.53 K/uL (03-08-24 @ 17:21)    Creatine Trend:  Creatinine: 0.61 (03-09)  Creatinine: 0.50 (03-08)    Liver Biochemical Testing Trend:  Alanine Aminotransferase (ALT/SGPT): 13 (03-09)  Alanine Aminotransferase (ALT/SGPT): 17 (03-08)  Aspartate Aminotransferase (AST/SGOT): 15 (03-09-24 @ 06:50)  Aspartate Aminotransferase (AST/SGOT): 37 (03-08-24 @ 17:21)  Bilirubin Total: 0.3 (03-09)  Bilirubin Total: 0.2 (03-08)    Auto Eosinophil %: 0.2 % (03-09-24 @ 06:50)  Auto Eosinophil %: 0.1 % (03-08-24 @ 17:21)    Urinalysis Basic - ( 09 Mar 2024 06:50 )    Color: x / Appearance: x / SG: x / pH: x  Gluc: 103 mg/dL / Ketone: x  / Bili: x / Urobili: x   Blood: x / Protein: x / Nitrite: x   Leuk Esterase: x / RBC: x / WBC x   Sq Epi: x / Non Sq Epi: x / Bacteria: x    MICROBIOLOGY:    HIV-1/2 Combo Result: Nonreact (03-09-24 @ 06:50)    Rapid RVP Result: NotDetec (03-08 @ 17:21)    C-Reactive Protein, Serum: 43.7 (03-08)    Blood Gas Venous - Lactate: 1.0 (03-08 @ 21:24)  Blood Gas Venous - Lactate: 2.9 (03-08 @ 17:21)    A1C with Estimated Average Glucose Result: 5.2 % (03-09-24 @ 06:50)    RADIOLOGY:  imaging below personally reviewed    < from: CT Chest w/ IV Cont (03.08.24 @ 19:41) >  IMPRESSION:  Mild skin thickening in the bilateral breasts is favored to be   postsurgical in etiology given recent procedure. Correlate with physical   exam findings to exclude a mild acute cellulitis. No underlying fluid   collection or significant induration of the subcutaneous fat.    No acute visceral pathology in the chest, abdomen, or pelvis.    < end of copied text >

## 2024-03-09 NOTE — H&P ADULT - PROBLEM SELECTOR PLAN 3
-likely allergic reaction to environmental triggers, unclear source    -symptoms management with IV benadryl   -marked line to track daily progress -likely allergic reaction to environmental triggers, unclear source, not itchy or painful, no vesicles,    -check EBV, CMV, HIV,   -symptoms management with IV benadryl   -marked line to track daily progress -likely allergic reaction to environmental triggers, unclear source, not itchy or painful, no vesicles, she has all childhood vaccines, and COVID 19 vaccines.     -check EBV, CMV, HIV, Mycoplasma, Lyme, parvo 19,strep, ciro mountain spotted fever,   -symptoms management with IV benadryl   -marked line to track daily progress -likely allergic reaction to environmental triggers, unclear source, not itchy or painful, no vesicles, she has all childhood vaccines, and COVID 19 vaccines, also concerning for cellulitis/erysipelas based on imaging studies, pe less likely    -check EBV, CMV, HIV, Mycoplasma, Lyme, parvo 19,strep, ciro mountain spotted fever,   -symptoms management with IV benadryl   -marked line to track daily progress -unclear etiology, not itchy or painful, no vesicles, she has all childhood vaccines, and COVID 19 vaccines, also concerning for cellulitis based on imaging studies    -check EBV, CMV, HIV, Mycoplasma, Lyme, parvo 19,strep, ciro mountain spotted fever  -marked line to track daily progress

## 2024-03-09 NOTE — PATIENT PROFILE ADULT - NSPROPTRIGHTBILLOFRIGHTS_GEN_A_NUR
Patient has not been eating well lately, was recently diagnosed with dementia. Daughter noticed a rash on her lower back and on her abdomen. Has been complaining of back pain and putting a heating pad there. Daughter concerned for dehydration. patient

## 2024-03-09 NOTE — H&P ADULT - PROBLEM SELECTOR PLAN 5
Diet: Regular  DVT ppt: lovenox   Dispo: pending work up. Diet: Regular  DVT ppt: lovenox   Dispo: pending work up.    #QT prolonged on presenting EKG  repeat EKG ordered for AM, f/u  avoid QT prolonging meds

## 2024-03-09 NOTE — H&P ADULT - PROBLEM SELECTOR PLAN 2
Low suspicion of meningitis in the setting of lack of photophobia or nuchal rigidity. Low suspicion of meningitis in the setting of lack of photophobia or nuchal rigidity, could be allergic sinusitis, or bacterial sinusitis given the fever, maxillary pain, and sense of bad odor.     -consider Augmentin to cover possible bacterial infections   -consider anti histamine if allergic causes   - ID was consulted Low suspicion of meningitis in the setting of lack of photophobia or nuchal rigidity, could be allergic sinusitis, or bacterial sinusitis given the fever, maxillary pain, and sense of bad odor.     -consider Augmentin to cover possible bacterial infections , had good response in the past  -consider anti histamine if allergic causes   - ID was consulted Low suspicion of meningitis in the setting of lack of photophobia or nuchal rigidity, could be allergic sinusitis, or bacterial sinusitis given the fever, maxillary pain, and sense of bad odor.     -Full RVP  -consider Augmentin to cover possible bacterial infections , had good response in the past  -consider anti histamine if allergic causes   - ID was consulted Low suspicion of meningitis in the setting of lack of photophobia or nuchal rigidity, could be allergic sinusitis, or bacterial sinusitis given the fever, maxillary pain, and sense of bad odor.     -Full RVP -> negative  -c/w pip/tazo for now, f/u ID recs  - ID was consulted

## 2024-03-09 NOTE — PROGRESS NOTE ADULT - PROBLEM SELECTOR PLAN 3
-likely allergic reaction to environmental triggers, unclear source, not itchy or painful, no vesicles, she has all childhood vaccines, and COVID 19 vaccines, also concerning for cellulitis/erysipelas based on imaging studies, pe less likely    -check EBV, CMV, HIV, Mycoplasma, Lyme, parvo 19,strep, ciro mountain spotted fever,   -symptoms management with IV benadryl   -marked line to track daily progress Met SIRS, In the setting of elevated white count, fever and tachycardia with unknown origin, likely 2/2 URI infection with fever and rash     Plan:  - Likely 2/2 cellulitis  - s/p vanc and zosyn x 1 in ED  - c/w zosyn  - f/u full RVP  - f/u Ucx, Bcx  - f/u ID recs

## 2024-03-10 LAB
ALBUMIN SERPL ELPH-MCNC: 3.5 G/DL — SIGNIFICANT CHANGE UP (ref 3.3–5)
ALP SERPL-CCNC: 50 U/L — SIGNIFICANT CHANGE UP (ref 40–120)
ALT FLD-CCNC: 23 U/L — SIGNIFICANT CHANGE UP (ref 4–33)
ANION GAP SERPL CALC-SCNC: 11 MMOL/L — SIGNIFICANT CHANGE UP (ref 7–14)
AST SERPL-CCNC: 22 U/L — SIGNIFICANT CHANGE UP (ref 4–32)
BASOPHILS # BLD AUTO: 0.03 K/UL — SIGNIFICANT CHANGE UP (ref 0–0.2)
BASOPHILS NFR BLD AUTO: 0.5 % — SIGNIFICANT CHANGE UP (ref 0–2)
BILIRUB SERPL-MCNC: <0.2 MG/DL — SIGNIFICANT CHANGE UP (ref 0.2–1.2)
BUN SERPL-MCNC: 6 MG/DL — LOW (ref 7–23)
CALCIUM SERPL-MCNC: 8.4 MG/DL — SIGNIFICANT CHANGE UP (ref 8.4–10.5)
CHLORIDE SERPL-SCNC: 108 MMOL/L — HIGH (ref 98–107)
CO2 SERPL-SCNC: 21 MMOL/L — LOW (ref 22–31)
CREAT SERPL-MCNC: 0.47 MG/DL — LOW (ref 0.5–1.3)
EGFR: 125 ML/MIN/1.73M2 — SIGNIFICANT CHANGE UP
EOSINOPHIL # BLD AUTO: 0.21 K/UL — SIGNIFICANT CHANGE UP (ref 0–0.5)
EOSINOPHIL NFR BLD AUTO: 3.6 % — SIGNIFICANT CHANGE UP (ref 0–6)
GLUCOSE SERPL-MCNC: 91 MG/DL — SIGNIFICANT CHANGE UP (ref 70–99)
HCT VFR BLD CALC: 34.5 % — SIGNIFICANT CHANGE UP (ref 34.5–45)
HGB BLD-MCNC: 10.9 G/DL — LOW (ref 11.5–15.5)
IANC: 4.15 K/UL — SIGNIFICANT CHANGE UP (ref 1.8–7.4)
IMM GRANULOCYTES NFR BLD AUTO: 0.3 % — SIGNIFICANT CHANGE UP (ref 0–0.9)
LYMPHOCYTES # BLD AUTO: 0.99 K/UL — LOW (ref 1–3.3)
LYMPHOCYTES # BLD AUTO: 16.9 % — SIGNIFICANT CHANGE UP (ref 13–44)
MAGNESIUM SERPL-MCNC: 2.2 MG/DL — SIGNIFICANT CHANGE UP (ref 1.6–2.6)
MCHC RBC-ENTMCNC: 25.2 PG — LOW (ref 27–34)
MCHC RBC-ENTMCNC: 31.6 GM/DL — LOW (ref 32–36)
MCV RBC AUTO: 79.9 FL — LOW (ref 80–100)
MONOCYTES # BLD AUTO: 0.46 K/UL — SIGNIFICANT CHANGE UP (ref 0–0.9)
MONOCYTES NFR BLD AUTO: 7.8 % — SIGNIFICANT CHANGE UP (ref 2–14)
MRSA PCR RESULT.: SIGNIFICANT CHANGE UP
NEUTROPHILS # BLD AUTO: 4.15 K/UL — SIGNIFICANT CHANGE UP (ref 1.8–7.4)
NEUTROPHILS NFR BLD AUTO: 70.9 % — SIGNIFICANT CHANGE UP (ref 43–77)
NRBC # BLD: 0 /100 WBCS — SIGNIFICANT CHANGE UP (ref 0–0)
NRBC # FLD: 0 K/UL — SIGNIFICANT CHANGE UP (ref 0–0)
PHOSPHATE SERPL-MCNC: 2.6 MG/DL — SIGNIFICANT CHANGE UP (ref 2.5–4.5)
PLATELET # BLD AUTO: 232 K/UL — SIGNIFICANT CHANGE UP (ref 150–400)
POTASSIUM SERPL-MCNC: 4 MMOL/L — SIGNIFICANT CHANGE UP (ref 3.5–5.3)
POTASSIUM SERPL-SCNC: 4 MMOL/L — SIGNIFICANT CHANGE UP (ref 3.5–5.3)
PROT SERPL-MCNC: 6.3 G/DL — SIGNIFICANT CHANGE UP (ref 6–8.3)
RBC # BLD: 4.32 M/UL — SIGNIFICANT CHANGE UP (ref 3.8–5.2)
RBC # FLD: 13.5 % — SIGNIFICANT CHANGE UP (ref 10.3–14.5)
S AUREUS DNA NOSE QL NAA+PROBE: SIGNIFICANT CHANGE UP
SODIUM SERPL-SCNC: 140 MMOL/L — SIGNIFICANT CHANGE UP (ref 135–145)
WBC # BLD: 5.86 K/UL — SIGNIFICANT CHANGE UP (ref 3.8–10.5)
WBC # FLD AUTO: 5.86 K/UL — SIGNIFICANT CHANGE UP (ref 3.8–10.5)

## 2024-03-10 PROCEDURE — 99233 SBSQ HOSP IP/OBS HIGH 50: CPT

## 2024-03-10 RX ORDER — SODIUM CHLORIDE 9 MG/ML
500 INJECTION, SOLUTION INTRAVENOUS
Refills: 0 | Status: DISCONTINUED | OUTPATIENT
Start: 2024-03-10 | End: 2024-03-11

## 2024-03-10 RX ADMIN — Medication 650 MILLIGRAM(S): at 06:34

## 2024-03-10 RX ADMIN — Medication 650 MILLIGRAM(S): at 17:07

## 2024-03-10 RX ADMIN — Medication 100 MILLIGRAM(S): at 06:34

## 2024-03-10 RX ADMIN — ESCITALOPRAM OXALATE 20 MILLIGRAM(S): 10 TABLET, FILM COATED ORAL at 13:21

## 2024-03-10 RX ADMIN — Medication 100 MILLIGRAM(S): at 21:32

## 2024-03-10 RX ADMIN — Medication 650 MILLIGRAM(S): at 22:44

## 2024-03-10 RX ADMIN — Medication 650 MILLIGRAM(S): at 07:34

## 2024-03-10 RX ADMIN — Medication 100 MILLIGRAM(S): at 13:16

## 2024-03-10 RX ADMIN — SODIUM CHLORIDE 60 MILLILITER(S): 9 INJECTION, SOLUTION INTRAVENOUS at 14:07

## 2024-03-10 RX ADMIN — Medication 650 MILLIGRAM(S): at 16:07
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
## 2024-03-10 NOTE — PROGRESS NOTE ADULT - PROBLEM SELECTOR PLAN 1
Few days of rash over abdomen and lower breast, hard and warm to touch, otherwise no tenderness or itchy    Plan:  - Likely cellulitis  - ID recs appreciated  - will start Ancef 1g q8h and monitor for resolution  - f/u EBV, CMV, HIV, Mycoplasma, Lyme, parvo 19,strep, ciro mountain spotted fever Few days of rash over abdomen and lower breast, hard and warm to touch, otherwise no tenderness or itchy    Plan:  - Likely cellulitis  - ID recs appreciated  - will start Ancef 1g q8h and monitor for resolution  - f/u EBV, CMV, HIV, Mycoplasma, Lyme, parvo 19,strep, ciro mountain spotted fever  - Will obtain culture of abscess if possible.

## 2024-03-10 NOTE — PROGRESS NOTE ADULT - SUBJECTIVE AND OBJECTIVE BOX
PROGRESS NOTE:   Authored by Ti Mejia MD   Patient is a 39y old  Female who presents with a chief complaint of rash fever (09 Mar 2024 12:24)      SUBJECTIVE / OVERNIGHT EVENTS:  No acute events overnight.     ADDITIONAL REVIEW OF SYSTEMS:    MEDICATIONS  (STANDING):  ceFAZolin   IVPB      ceFAZolin   IVPB 1000 milliGRAM(s) IV Intermittent every 8 hours  enoxaparin Injectable 40 milliGRAM(s) SubCutaneous every 24 hours  escitalopram 20 milliGRAM(s) Oral daily  sodium chloride 0.9%. 1000 milliLiter(s) (100 mL/Hr) IV Continuous <Continuous>    MEDICATIONS  (PRN):  acetaminophen     Tablet .. 650 milliGRAM(s) Oral every 6 hours PRN Temp greater or equal to 38C (100.4F), Mild Pain (1 - 3)  aluminum hydroxide/magnesium hydroxide/simethicone Suspension 30 milliLiter(s) Oral every 4 hours PRN Dyspepsia  melatonin 3 milliGRAM(s) Oral at bedtime PRN Insomnia      CAPILLARY BLOOD GLUCOSE        I&O's Summary      PHYSICAL EXAM:  Vital Signs Last 24 Hrs  T(C): 36.7 (10 Mar 2024 04:52), Max: 38.1 (09 Mar 2024 15:07)  T(F): 98 (10 Mar 2024 04:52), Max: 100.6 (09 Mar 2024 15:07)  HR: 60 (10 Mar 2024 04:52) (60 - 93)  BP: 93/51 (10 Mar 2024 04:52) (93/51 - 132/62)  BP(mean): --  RR: 18 (10 Mar 2024 04:52) (18 - 18)  SpO2: 96% (10 Mar 2024 04:52) (96% - 100%)    Parameters below as of 10 Mar 2024 03:47  Patient On (Oxygen Delivery Method): room air        GENERAL: AAOx3, NAD  Cardiovascular: RRR, S1 S2, no m/r/g. No extremities edema, no JVD  LUNGS: Unlabored respirations, CTABL no wheeze/rhonchi/crackles  ABDOMEN: Soft, NTND, large raised rash seen on abdomen to lower breast, warm to touch, no tenderness to palpation  EXTREMITIES: Warm extremities, no clubbing, cyanosis, or edema, pulses 2+ bilaterally  NEURO: CN 2-12 grossly intact, strength 5/5 throughout, sensation intact    LABS:                        10.9   5.86  )-----------( 232      ( 10 Mar 2024 04:47 )             34.5     03-09    139  |  107  |  6<L>  ----------------------------<  103<H>  3.6   |  21<L>  |  0.61    Ca    7.7<L>      09 Mar 2024 06:50  Phos  2.7     03-09  Mg     2.00     03-09    TPro  6.1  /  Alb  3.4  /  TBili  0.3  /  DBili  x   /  AST  15  /  ALT  13  /  AlkPhos  45  03-09          Urinalysis Basic - ( 09 Mar 2024 06:50 )    Color: x / Appearance: x / SG: x / pH: x  Gluc: 103 mg/dL / Ketone: x  / Bili: x / Urobili: x   Blood: x / Protein: x / Nitrite: x   Leuk Esterase: x / RBC: x / WBC x   Sq Epi: x / Non Sq Epi: x / Bacteria: x        Culture - Urine (collected 08 Mar 2024 18:09)  Source: Clean Catch Clean Catch (Midstream)  Final Report (09 Mar 2024 23:40):    <10,000 CFU/mL Normal Urogenital Nupur    Culture - Blood (collected 08 Mar 2024 17:36)  Source: .Blood Blood-Venous  Preliminary Report (09 Mar 2024 22:02):    No growth at 24 hours    Culture - Blood (collected 08 Mar 2024 17:21)  Source: .Blood Blood-Peripheral  Preliminary Report (09 Mar 2024 22:03):    No growth at 24 hours        RADIOLOGY & ADDITIONAL TESTS:  Lab Results Reviewed   Imaging Reviewed  Electrocardiogram Reviewed   PROGRESS NOTE:   Authored by Ti Mejia MD   Patient is a 39y old  Female who presents with a chief complaint of rash fever (09 Mar 2024 12:24)      SUBJECTIVE / OVERNIGHT EVENTS:  No acute events overnight.     Pt reporting improvement in swelling, no pain, no pruritus but mild increase in swelling of area inbetween breasts.     + drainage from rt nipple this AM.     ADDITIONAL REVIEW OF SYSTEMS:    MEDICATIONS  (STANDING):  ceFAZolin   IVPB      ceFAZolin   IVPB 1000 milliGRAM(s) IV Intermittent every 8 hours  enoxaparin Injectable 40 milliGRAM(s) SubCutaneous every 24 hours  escitalopram 20 milliGRAM(s) Oral daily  sodium chloride 0.9%. 1000 milliLiter(s) (100 mL/Hr) IV Continuous <Continuous>    MEDICATIONS  (PRN):  acetaminophen     Tablet .. 650 milliGRAM(s) Oral every 6 hours PRN Temp greater or equal to 38C (100.4F), Mild Pain (1 - 3)  aluminum hydroxide/magnesium hydroxide/simethicone Suspension 30 milliLiter(s) Oral every 4 hours PRN Dyspepsia  melatonin 3 milliGRAM(s) Oral at bedtime PRN Insomnia      CAPILLARY BLOOD GLUCOSE        I&O's Summary      PHYSICAL EXAM:  Vital Signs Last 24 Hrs  T(C): 36.7 (10 Mar 2024 04:52), Max: 38.1 (09 Mar 2024 15:07)  T(F): 98 (10 Mar 2024 04:52), Max: 100.6 (09 Mar 2024 15:07)  HR: 60 (10 Mar 2024 04:52) (60 - 93)  BP: 93/51 (10 Mar 2024 04:52) (93/51 - 132/62)  BP(mean): --  RR: 18 (10 Mar 2024 04:52) (18 - 18)  SpO2: 96% (10 Mar 2024 04:52) (96% - 100%)    Parameters below as of 10 Mar 2024 03:47  Patient On (Oxygen Delivery Method): room air        GENERAL: AAOx3, NAD  Cardiovascular: RRR, S1 S2, no m/r/g. No extremities edema, no JVD  LUNGS: Unlabored respirations, CTABL no wheeze/rhonchi/crackles  ABDOMEN: Soft, NTND, large raised rash seen on abdomen to lower breast, warm to touch, no tenderness to palpation  Black marker of border, no expansion 2-3 cm regression from marker line.   EXTREMITIES: Warm extremities, no clubbing, cyanosis, or edema, pulses 2+ bilaterally  NEURO: CN 2-12 grossly intact, strength 5/5 throughout, sensation intact    LABS:                        10.9   5.86  )-----------( 232      ( 10 Mar 2024 04:47 )             34.5     03-09    139  |  107  |  6<L>  ----------------------------<  103<H>  3.6   |  21<L>  |  0.61    Ca    7.7<L>      09 Mar 2024 06:50  Phos  2.7     03-09  Mg     2.00     03-09    TPro  6.1  /  Alb  3.4  /  TBili  0.3  /  DBili  x   /  AST  15  /  ALT  13  /  AlkPhos  45  03-09          Urinalysis Basic - ( 09 Mar 2024 06:50 )    Color: x / Appearance: x / SG: x / pH: x  Gluc: 103 mg/dL / Ketone: x  / Bili: x / Urobili: x   Blood: x / Protein: x / Nitrite: x   Leuk Esterase: x / RBC: x / WBC x   Sq Epi: x / Non Sq Epi: x / Bacteria: x        Culture - Urine (collected 08 Mar 2024 18:09)  Source: Clean Catch Clean Catch (Midstream)  Final Report (09 Mar 2024 23:40):    <10,000 CFU/mL Normal Urogenital Nupur    Culture - Blood (collected 08 Mar 2024 17:36)  Source: .Blood Blood-Venous  Preliminary Report (09 Mar 2024 22:02):    No growth at 24 hours    Culture - Blood (collected 08 Mar 2024 17:21)  Source: .Blood Blood-Peripheral  Preliminary Report (09 Mar 2024 22:03):    No growth at 24 hours        RADIOLOGY & ADDITIONAL TESTS:  Lab Results Reviewed   Imaging Reviewed  Electrocardiogram Reviewed

## 2024-03-10 NOTE — PROGRESS NOTE ADULT - ASSESSMENT
40 yo F with PMH breast reduction 12/7/2023 with Dr. Perez [facility], acute sinusitis treated 6 weeks ago with 7 days course of amoxicillin presenting to ED for facial pain and evaluation of new onset rash on bilateral upper abdomen over breast reduction incision sites, and right flank, likely cellulitis

## 2024-03-10 NOTE — PROGRESS NOTE ADULT - PROBLEM SELECTOR PLAN 2
Low suspicion of meningitis in the setting of lack of photophobia or nuchal rigidity, could be allergic sinusitis, or bacterial sinusitis given the fever, maxillary pain, and sense of bad odor.     Plan:  - f/u full RVP  - Sepsis work up as below  - Likely 2/2 cellulitis +/- bacterial sinusiti

## 2024-03-11 ENCOUNTER — TRANSCRIPTION ENCOUNTER (OUTPATIENT)
Age: 39
End: 2024-03-11

## 2024-03-11 LAB
ALBUMIN SERPL ELPH-MCNC: 3.9 G/DL — SIGNIFICANT CHANGE UP (ref 3.3–5)
ALP SERPL-CCNC: 52 U/L — SIGNIFICANT CHANGE UP (ref 40–120)
ALT FLD-CCNC: 20 U/L — SIGNIFICANT CHANGE UP (ref 4–33)
ANION GAP SERPL CALC-SCNC: 16 MMOL/L — HIGH (ref 7–14)
AST SERPL-CCNC: 29 U/L — SIGNIFICANT CHANGE UP (ref 4–32)
B19V DNA FLD QL NAA+PROBE: SIGNIFICANT CHANGE UP IU/ML
BASOPHILS # BLD AUTO: 0.09 K/UL — SIGNIFICANT CHANGE UP (ref 0–0.2)
BASOPHILS NFR BLD AUTO: 1.4 % — SIGNIFICANT CHANGE UP (ref 0–2)
BILIRUB SERPL-MCNC: <0.2 MG/DL — SIGNIFICANT CHANGE UP (ref 0.2–1.2)
BUN SERPL-MCNC: 10 MG/DL — SIGNIFICANT CHANGE UP (ref 7–23)
CALCIUM SERPL-MCNC: 9.2 MG/DL — SIGNIFICANT CHANGE UP (ref 8.4–10.5)
CHLORIDE SERPL-SCNC: 105 MMOL/L — SIGNIFICANT CHANGE UP (ref 98–107)
CO2 SERPL-SCNC: 19 MMOL/L — LOW (ref 22–31)
CREAT SERPL-MCNC: 0.46 MG/DL — LOW (ref 0.5–1.3)
EGFR: 126 ML/MIN/1.73M2 — SIGNIFICANT CHANGE UP
EOSINOPHIL # BLD AUTO: 0.45 K/UL — SIGNIFICANT CHANGE UP (ref 0–0.5)
EOSINOPHIL NFR BLD AUTO: 7.1 % — HIGH (ref 0–6)
GLUCOSE SERPL-MCNC: 73 MG/DL — SIGNIFICANT CHANGE UP (ref 70–99)
HCT VFR BLD CALC: 41.3 % — SIGNIFICANT CHANGE UP (ref 34.5–45)
HGB BLD-MCNC: 12.2 G/DL — SIGNIFICANT CHANGE UP (ref 11.5–15.5)
IANC: 3.61 K/UL — SIGNIFICANT CHANGE UP (ref 1.8–7.4)
IMM GRANULOCYTES NFR BLD AUTO: 0.6 % — SIGNIFICANT CHANGE UP (ref 0–0.9)
LYMPHOCYTES # BLD AUTO: 1.66 K/UL — SIGNIFICANT CHANGE UP (ref 1–3.3)
LYMPHOCYTES # BLD AUTO: 26.3 % — SIGNIFICANT CHANGE UP (ref 13–44)
M PNEUMO IGM SER-ACNC: 0.65 INDEX — SIGNIFICANT CHANGE UP (ref 0–0.9)
MAGNESIUM SERPL-MCNC: 2.1 MG/DL — SIGNIFICANT CHANGE UP (ref 1.6–2.6)
MCHC RBC-ENTMCNC: 25.3 PG — LOW (ref 27–34)
MCHC RBC-ENTMCNC: 29.5 GM/DL — LOW (ref 32–36)
MCV RBC AUTO: 85.7 FL — SIGNIFICANT CHANGE UP (ref 80–100)
MONOCYTES # BLD AUTO: 0.47 K/UL — SIGNIFICANT CHANGE UP (ref 0–0.9)
MONOCYTES NFR BLD AUTO: 7.4 % — SIGNIFICANT CHANGE UP (ref 2–14)
MYCOPLASMA AG SPEC QL: NEGATIVE — SIGNIFICANT CHANGE UP
NEUTROPHILS # BLD AUTO: 3.61 K/UL — SIGNIFICANT CHANGE UP (ref 1.8–7.4)
NEUTROPHILS NFR BLD AUTO: 57.2 % — SIGNIFICANT CHANGE UP (ref 43–77)
NRBC # BLD: 0 /100 WBCS — SIGNIFICANT CHANGE UP (ref 0–0)
NRBC # FLD: 0 K/UL — SIGNIFICANT CHANGE UP (ref 0–0)
PHOSPHATE SERPL-MCNC: 3.7 MG/DL — SIGNIFICANT CHANGE UP (ref 2.5–4.5)
PLATELET # BLD AUTO: 189 K/UL — SIGNIFICANT CHANGE UP (ref 150–400)
POTASSIUM SERPL-MCNC: 4.7 MMOL/L — SIGNIFICANT CHANGE UP (ref 3.5–5.3)
POTASSIUM SERPL-SCNC: 4.7 MMOL/L — SIGNIFICANT CHANGE UP (ref 3.5–5.3)
PROT SERPL-MCNC: 7.2 G/DL — SIGNIFICANT CHANGE UP (ref 6–8.3)
RBC # BLD: 4.82 M/UL — SIGNIFICANT CHANGE UP (ref 3.8–5.2)
RBC # FLD: 13.7 % — SIGNIFICANT CHANGE UP (ref 10.3–14.5)
SODIUM SERPL-SCNC: 140 MMOL/L — SIGNIFICANT CHANGE UP (ref 135–145)
WBC # BLD: 6.32 K/UL — SIGNIFICANT CHANGE UP (ref 3.8–10.5)
WBC # FLD AUTO: 6.32 K/UL — SIGNIFICANT CHANGE UP (ref 3.8–10.5)

## 2024-03-11 PROCEDURE — 99232 SBSQ HOSP IP/OBS MODERATE 35: CPT

## 2024-03-11 PROCEDURE — 99233 SBSQ HOSP IP/OBS HIGH 50: CPT

## 2024-03-11 RX ORDER — POLYETHYLENE GLYCOL 3350 17 G/17G
17 POWDER, FOR SOLUTION ORAL
Refills: 0 | Status: DISCONTINUED | OUTPATIENT
Start: 2024-03-11 | End: 2024-03-12

## 2024-03-11 RX ORDER — SENNA PLUS 8.6 MG/1
2 TABLET ORAL AT BEDTIME
Refills: 0 | Status: DISCONTINUED | OUTPATIENT
Start: 2024-03-11 | End: 2024-03-12

## 2024-03-11 RX ORDER — CEPHALEXIN 500 MG
1 CAPSULE ORAL
Qty: 28 | Refills: 0
Start: 2024-03-11 | End: 2024-03-17

## 2024-03-11 RX ADMIN — Medication 650 MILLIGRAM(S): at 22:26

## 2024-03-11 RX ADMIN — Medication 650 MILLIGRAM(S): at 23:20

## 2024-03-11 RX ADMIN — Medication 100 MILLIGRAM(S): at 22:25

## 2024-03-11 RX ADMIN — ESCITALOPRAM OXALATE 20 MILLIGRAM(S): 10 TABLET, FILM COATED ORAL at 11:59

## 2024-03-11 RX ADMIN — Medication 100 MILLIGRAM(S): at 11:59

## 2024-03-11 RX ADMIN — Medication 100 MILLIGRAM(S): at 05:32

## 2024-03-11 RX ADMIN — POLYETHYLENE GLYCOL 3350 17 GRAM(S): 17 POWDER, FOR SOLUTION ORAL at 22:25

## 2024-03-11 NOTE — DISCHARGE NOTE PROVIDER - NSDCMRMEDTOKEN_GEN_ALL_CORE_FT
Lexapro 20 mg oral tablet: 1 tab(s) orally 2 times a day   cephalexin 500 mg oral tablet: 1 tab(s) orally 4 times a day  Lexapro 20 mg oral tablet: 1 tab(s) orally 2 times a day

## 2024-03-11 NOTE — DISCHARGE NOTE PROVIDER - CARE PROVIDER_API CALL
Ollie Segovia  Internal Medicine  [address] [place] [address], [address]  [place] [address]  Phone: [phone]  Fax: [phone]  Established Patient  Follow Up Time: 1 week    Bruce Beatty  Plastic Surgery  [address] [facility], [address]  [place] [address]  Phone: [phone]  Fax: [phone]  Established Patient  Follow Up Time: 1 week   Ollie Segovia  Internal Medicine  [address], [address]  [place] [address]  Phone: [phone]  Fax: [phone]  Established Patient  Follow Up Time: 1 week    Bruce Beatty  Plastic Surgery  [address] [facility], [address]  [place] [address]  Phone: [phone]  Fax: [phone]  Established Patient  Follow Up Time: 1 week    Aneesh Castillo  Infectious Disease  [address] [place] [address]  Phone: [phone]  Fax: [phone]  Follow Up Time: 1 week

## 2024-03-11 NOTE — PROGRESS NOTE ADULT - ASSESSMENT
38 yo F with PMH breast reduction 12/7/2023 with Dr. Perez [facility], acute sinusitis treated 6 weeks ago with 7 days course of amoxicillin presenting to ED for facial pain and evaluation of new onset rash on bilateral upper abdomen over breast reduction incision sites, and right flank, likely cellulitis

## 2024-03-11 NOTE — DISCHARGE NOTE PROVIDER - HOSPITAL COURSE
HPI:  40 yo F with PMH breast reduction 12/7/2023 with Dr. Perez, acute sinusitis treated 6 weeks ago with 7 days course of amoxicillin presenting to ED for facial pain and evaluation of new onset rash on bilateral upper abdomen over breast reduction incision sites, and right flank. Patient this am woke up with  new symptoms of new onset fevers, chills, nausea, headache. 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  She went to urgent care where flu and covid tests were negative. She denies CP, SOB, diarrhea, urinary symptoms abdominal pain. She denied new detergent, clothing, soap, or sheet. Of note, post breast reduction was c/b abscess which was eventually healed about 5 weeks ago. Her child from  was sick 5 days ago from cold like symptoms.     In the ED, CBC significant for WBC elevated at 15.Stable Hgb, UA negative for acute UTI, viral panel negative for acute viral illness, CT head: No hydrocephalus, acute intracranial hemorrhage, mass effect, or brain edema. Bilateral maxillary sinus air-fluid levels, correlate for the presence of acute sinusitis. CT chest, abdomen and pelvis: Mild skin thickening in the bilateral breasts is favored to be postsurgical in etiology given recent procedure. Correlate with physical exam findings to exclude a mild acute cellulitis. No acute visceral pathology in the chest, abdomen, or pelvis. Dr. Ware office was called, no recommendation for emergent surgical procedure at this time, only recommended labs, abx. she received fluids, tylenol and zofran. IV abx (vanc and zosyn) started. Rash increasing in size, now marked on chest and abdomen. ID was consulted. Patient reports symptoms improving since arrival to ED.  (09 Mar 2024 01:54)    Hospital Course:  Patient responded well to abx, ID and derm were consulted, started on ANCEF. Plastics called to eval patient. Cellulitis improved well. No fevers or chills after admission.     Important Medication Changes and Reason:  Antibiotics as prescribed.     Active or Pending Issues Requiring Follow-up:  Cellulitis being treated     Advanced Directives:   [ X] Full code  [ ] DNR  [ ] Hospice    Discharge Diagnoses:  Cellulitis  ?Sinusitis          HPI:  38 yo F with PMH breast reduction 12/7/2023 with Dr. Perez, acute sinusitis treated 6 weeks ago with 7 days course of amoxicillin presenting to ED for facial pain and evaluation of new onset rash on bilateral upper abdomen over breast reduction incision sites, and right flank. Patient this am woke up with  new symptoms of new onset fevers, chills, nausea, headache. 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  She went to urgent care where flu and covid tests were negative. She denies CP, SOB, diarrhea, urinary symptoms abdominal pain. She denied new detergent, clothing, soap, or sheet. Of note, post breast reduction was c/b abscess which was eventually healed about 5 weeks ago. Her child from  was sick 5 days ago from cold like symptoms.     In the ED, CBC significant for WBC elevated at 15.Stable Hgb, UA negative for acute UTI, viral panel negative for acute viral illness, CT head: No hydrocephalus, acute intracranial hemorrhage, mass effect, or brain edema. Bilateral maxillary sinus air-fluid levels, correlate for the presence of acute sinusitis. CT chest, abdomen and pelvis: Mild skin thickening in the bilateral breasts is favored to be postsurgical in etiology given recent procedure. Correlate with physical exam findings to exclude a mild acute cellulitis. No acute visceral pathology in the chest, abdomen, or pelvis. Dr. Ware office was called, no recommendation for emergent surgical procedure at this time, only recommended labs, abx. she received fluids, tylenol and zofran. IV abx (vanc and zosyn) started. Rash increasing in size, now marked on chest and abdomen. ID was consulted. Patient reports symptoms improving since arrival to ED.  (09 Mar 2024 01:54)    Hospital Course:  Patient responded well to abx, ID and derm were consulted, started on ANCEF. Plastics called to eval patient. Cellulitis improved well. No fevers or chills after admission.     Important Medication Changes and Reason:  Antibiotics Keflex as prescribed.     Active or Pending Issues Requiring Follow-up:  Cellulitis being treated     Advanced Directives:   [ X] Full code  [ ] DNR  [ ] Hospice    Discharge Diagnoses:  Cellulitis  ?Sinusitis          HPI:  38 yo F with PMH breast reduction 12/7/2023 with Dr. Perez, acute sinusitis treated 6 weeks ago with 7 days course of amoxicillin presenting to ED for facial pain and evaluation of new onset rash on bilateral upper abdomen over breast reduction incision sites, and right flank. Patient this am woke up with  new symptoms of new onset fevers, chills, nausea, headache. 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  She went to urgent care where flu and covid tests were negative. She denies CP, SOB, diarrhea, urinary symptoms abdominal pain. She denied new detergent, clothing, soap, or sheet. Of note, post breast reduction was c/b abscess which was eventually healed about 5 weeks ago. Her child from  was sick 5 days ago from cold like symptoms.     In the ED, CBC significant for WBC elevated at 15.Stable Hgb, UA negative for acute UTI, viral panel negative for acute viral illness, CT head: No hydrocephalus, acute intracranial hemorrhage, mass effect, or brain edema. Bilateral maxillary sinus air-fluid levels, correlate for the presence of acute sinusitis. CT chest, abdomen and pelvis: Mild skin thickening in the bilateral breasts is favored to be postsurgical in etiology given recent procedure. Correlate with physical exam findings to exclude a mild acute cellulitis. No acute visceral pathology in the chest, abdomen, or pelvis. Dr. Ware office was called, no recommendation for emergent surgical procedure at this time, only recommended labs, abx. she received fluids, tylenol and zofran. IV abx (vanc and zosyn) started. Rash increasing in size, now marked on chest and abdomen. ID was consulted. Patient reports symptoms improving since arrival to ED.  (09 Mar 2024 01:54)    Hospital Course:  Patient responded well to abx, ID and derm were consulted, started on ANCEF. Plastics called to eval patient. Cellulitis improved well. No fevers or chills after admission. Patient will be discharged on Keflex to finish her course of antibiotic    Important Medication Changes and Reason:  Antibiotics Keflex as prescribed. (end date 3/18/24)    Active or Pending Issues Requiring Follow-up:  Cellulitis being treated     Advanced Directives:   [ X] Full code  [ ] DNR  [ ] Hospice    Discharge Diagnoses:  Cellulitis  ?Sinusitis

## 2024-03-11 NOTE — PROGRESS NOTE ADULT - PROBLEM SELECTOR PLAN 3
Met SIRS, In the setting of elevated white count, fever and tachycardia with unknown origin, likely 2/2 URI infection with fever and rash   RVP neg  Cultures negative.     Plan:  - Likely 2/2 cellulitis +/- sinusitis  - s/p vanc and zosyn x 1 in ED  - f/u ID recs  - c/w abx as above

## 2024-03-11 NOTE — PROGRESS NOTE ADULT - PROBLEM SELECTOR PLAN 2
Low suspicion of meningitis in the setting of lack of photophobia or nuchal rigidity, could be allergic sinusitis, or bacterial sinusitis given the fever, maxillary pain, and sense of bad odor.     Plan:  - RVP negative  - Sepsis work up as below  - bacterial sinusitis likely since recently had amoxicillin course with initial improvement, and patient w chronic hx of sinusitis  - on abx for cellulitis as above, will ensure coverage of sinusitis as well on DC

## 2024-03-11 NOTE — DISCHARGE NOTE PROVIDER - PROVIDER TOKENS
PROVIDER:[TOKEN:[[id number]],FOLLOWUP:[1 week],ESTABLISHEDPATIENT:[T]],PROVIDER:[TOKEN:[[id number]],FOLLOWUP:[1 week],ESTABLISHEDPATIENT:[T]] PROVIDER:[TOKEN:[[id number]],FOLLOWUP:[1 week],ESTABLISHEDPATIENT:[T]],PROVIDER:[TOKEN:[[id number]],FOLLOWUP:[1 week],ESTABLISHEDPATIENT:[T]],PROVIDER:[TOKEN:[[id number]],FOLLOWUP:[1 week]]

## 2024-03-11 NOTE — DISCHARGE NOTE PROVIDER - NSDCFUADDAPPT_GEN_ALL_CORE_FT
APPTS ARE READY TO BE MADE: [ X] YES    Best Family or Patient Contact (if needed):    Additional Information about above appointments (if needed):    1: PCP  2: Plastic Surgery  3:     Other comments or requests:    APPTS ARE READY TO BE MADE: [ X] YES    Best Family or Patient Contact (if needed):    Additional Information about above appointments (if needed):    1: PCP  2: Plastic Surgery  3: Infectious Disease, Dr. Castillo    Other comments or requests:    APPTS ARE READY TO BE MADE: [ X] YES    Best Family or Patient Contact (if needed):    Additional Information about above appointments (if needed):    1: PCP  2: Plastic Surgery  3: Infectious Disease, Dr. Castillo    Other comments or requests:   Patient was provided with follow up request details and was advised to call to schedule follow up within specified time frame. No scheduling assistance is needed at this time.

## 2024-03-11 NOTE — DISCHARGE NOTE PROVIDER - ATTENDING DISCHARGE PHYSICAL EXAMINATION:
GENERAL: NAD, well-groomed, well-developed  HEAD:  Atraumatic, Normocephalic  EYES: EOMI, PERRLA, conjunctiva and sclera clear  ENMT: No tonsillar erythema, exudates, or enlargement; Moist mucous membranes, Good dentition, No lesions  NECK: Supple, No JVD, Normal thyroid  NERVOUS SYSTEM:  Alert & Oriented X3, Good concentration; Motor Strength 5/5 B/L upper and lower extremities; DTRs 2+ intact and symmetric  CHEST/LUNG: Clear to percussion bilaterally; No rales, rhonchi, wheezing, or rubs  HEART: Regular rate and rhythm; No murmurs, rubs, or gallops  ABDOMEN: Soft, Nontender, Nondistended; Bowel sounds present  EXTREMITIES:  2+ Peripheral Pulses, No clubbing, cyanosis, or edema  LYMPH: No lymphadenopathy noted  SKIN: Erythema on anterior chest and near scars from breast surgery has improved significantly; no induration; no purulent drainage

## 2024-03-11 NOTE — PROGRESS NOTE ADULT - SUBJECTIVE AND OBJECTIVE BOX
Follow Up:  cellulitis    Interval History/ROS:  Overnight: No acute events.  Patient remains afebrile.  Otherwise hemodynamically stable on room air.  Latest labs show resolved leukocytosis, BMP with renal function within normal limits.  EBV with evidence for past infection.  CMV negative, Lyme negative.  HIV negative.  Blood cultures from 3/8/2024 negative.    Patient seen examined at bedside.  Feels at baseline.  Eager to go home.  Denies any new pain or discomfort.  Allergies  watermelon /melons (Angioedema)  No Known Drug Allergies        ANTIMICROBIALS:  ceFAZolin   IVPB    ceFAZolin   IVPB 1000 every 8 hours      OTHER MEDS:  MEDICATIONS  (STANDING):  acetaminophen     Tablet .. 650 every 6 hours PRN  aluminum hydroxide/magnesium hydroxide/simethicone Suspension 30 every 4 hours PRN  enoxaparin Injectable 40 every 24 hours  escitalopram 20 daily  melatonin 3 at bedtime PRN      Vital Signs Last 24 Hrs  T(C): 36.7 (11 Mar 2024 12:20), Max: 36.9 (11 Mar 2024 05:04)  T(F): 98.1 (11 Mar 2024 12:20), Max: 98.4 (11 Mar 2024 05:04)  HR: 78 (11 Mar 2024 12:20) (78 - 88)  BP: 112/71 (11 Mar 2024 12:20) (101/60 - 112/71)  BP(mean): --  RR: 18 (11 Mar 2024 12:20) (16 - 18)  SpO2: 99% (11 Mar 2024 12:20) (95% - 99%)    Parameters below as of 11 Mar 2024 12:20  Patient On (Oxygen Delivery Method): room air        PHYSICAL EXAM:  General: Patient in no acute distress   HEENT: NCAT, EOMI  CV: S1+S2  Lungs: No respiratory distress, CTAB  Abd: Soft, nontender  Abd wall erythema and warmth, extending upto b/l inferior breast incisions   Ext: No cyanosis, no edema  Neuro: Alert and oriented, no focal deficits  Skin: See Abd section   IV: No phlebitis                              12.2   6.32  )-----------( 189      ( 11 Mar 2024 05:32 )             41.3       03-11    140  |  105  |  10  ----------------------------<  73  4.7   |  19<L>  |  0.46<L>    Ca    9.2      11 Mar 2024 05:32  Phos  3.7     03-11  Mg     2.10     03-11    TPro  7.2  /  Alb  3.9  /  TBili  <0.2  /  DBili  x   /  AST  29  /  ALT  20  /  AlkPhos  52  03-11      Urinalysis Basic - ( 11 Mar 2024 05:32 )    Color: x / Appearance: x / SG: x / pH: x  Gluc: 73 mg/dL / Ketone: x  / Bili: x / Urobili: x   Blood: x / Protein: x / Nitrite: x   Leuk Esterase: x / RBC: x / WBC x   Sq Epi: x / Non Sq Epi: x / Bacteria: x        MICROBIOLOGY:  v    Culture - Urine (collected 08 Mar 2024 18:09)  Source: Clean Catch Clean Catch (Midstream)  Final Report (09 Mar 2024 23:40):    <10,000 CFU/mL Normal Urogenital Nupur    Culture - Blood (collected 08 Mar 2024 17:36)  Source: .Blood Blood-Venous  Preliminary Report (10 Mar 2024 22:02):    No growth at 48 Hours    Culture - Blood (collected 08 Mar 2024 17:21)  Source: .Blood Blood-Peripheral  Preliminary Report (10 Mar 2024 22:02):    No growth at 48 Hours              Rapid RVP Result: NotDetec (03-08 @ 17:21)        RADIOLOGY:  Imaging reviewed

## 2024-03-11 NOTE — DISCHARGE NOTE PROVIDER - NSDCCPTREATMENT_GEN_ALL_CORE_FT
PRINCIPAL PROCEDURE  Procedure: CTA chest w/w/o contrast  Findings and Treatment: IMPRESSION:  Mild skin thickening in the bilateral breasts is favored to be   postsurgical in etiology given recent procedure. Correlate with physical   exam findings to exclude a mild acute cellulitis. No underlying fluid   collection or significant induration of the subcutaneous fat.  No acute visceral pathology in the chest, abdomen, or pelvis.        SECONDARY PROCEDURE  Procedure: CT head w con  Findings and Treatment: TECHNIQUE: Noncontrast axial CT images were acquired through the head.   Two-dimensional sagittal and coronal reformats were generated.  COMPARISON STUDY: None available.  FINDINGS:  No hydrocephalus, mass effect, midline shift, acute intracranial   hemorrhage, or brain edema.  Bilateral maxillary sinus air-fluid levels. Right sphenoid and bilateral   ethmoid sinus mucosal thickening. Mastoid air cells clear.  IMPRESSION:  No hydrocephalus, acute intracranial hemorrhage, mass effect, or brain   edema.  Bilateral maxillary sinus air-fluid levels, correlate for the presence of   acute sinusitis.

## 2024-03-11 NOTE — CONSULT NOTE ADULT - SUBJECTIVE AND OBJECTIVE BOX
JENIFFER DAO\[facility]\""  [id number]    40 yo F with PMH breast reduction 12/7/2023 and liposuction, acute sinusitis treated 6 weeks ago with 7 days course of amoxicillin presenting to ED for facial pain and evaluation of new onset rash on bilateral upper abdomen over breast sites.  Patient reported fever and chills.  Denied any drainage or localized pain.        Fever due to unspecified condition    FH: hypertension (Mother)    FH: diabetes mellitus (Father)    FH: myocardial infarction (Mother)    Handoff    MEWS Score    Asthma    Seasonal allergies    Encounter for cosmetic surgery    Fever    Sepsis    Sinusitis    Skin rash    Prophylactic measure    Mood disorder    Other depression    Cellulitis    No significant past surgical history    H/O abdominoplasty    S/P bilateral breast reduction    SENT BY MD    90+    SysAdmin_VisitLink      watermelon /moris (Angioedema)  No Known Drug Allergies      T(C): 36.7 (03-11-24 @ 12:20), Max: 36.9 (03-11-24 @ 05:04)  HR: 78 (03-11-24 @ 12:20) (78 - 88)  BP: 112/71 (03-11-24 @ 12:20) (101/60 - 112/71)  RR: 18 (03-11-24 @ 12:20) (16 - 18)  SpO2: 99% (03-11-24 @ 12:20) (95% - 99%)  NAD  Breasts: Incisions healed, soft.  NAC viable with sensation.  NO collection.  NO tenderness.  + Redness in medial aspects.   Abdomen: Incisions healed. Soft.  No collection.  Redness in central and superior right side.      WBC: 6     CT A/P: Mild skin thickening.  NO collection.     Bcx: NGTD      A/P:  39yy.o s/p brm with liposuction.  Wounds healed with residual redness. No signs of collection.   - IV abx  -  hydration  - Monitor demarcation  - outpatient follow up     Thank You  Bruce Beatty MD  Plastic Surgery

## 2024-03-11 NOTE — DISCHARGE NOTE PROVIDER - CARE PROVIDERS DIRECT ADDRESSES
,DirectAddress_Unknown,DirectAddress_Unknown ,DirectAddress_Unknown,DirectAddress_Unknown,[email or web address]

## 2024-03-11 NOTE — PROGRESS NOTE ADULT - SUBJECTIVE AND OBJECTIVE BOX
Subjective:    INTERVAL HPI/OVERNIGHT EVENTS:   No acute events overnight  Afebrile, hemodynamically stable   - cellulitis margins receding nicely  - patient feels markedly improved  - no fevers, chills, n/v/d.     Telemetry:    T(F): 98.4 (03-11-24 @ 05:04), Max: 98.4 (03-11-24 @ 05:04)  HR: 88 (03-11-24 @ 05:04) (70 - 88)  BP: 101/60 (03-11-24 @ 05:04) (100/60 - 104/70)  RR: 17 (03-11-24 @ 05:04) (16 - 17)  SpO2: 97% (03-11-24 @ 05:04) (95% - 100%)  I&O's Summary    Weight (kg): 59.9 (03-08-24 @ 23:57)    Medications:  acetaminophen     Tablet .. 650 milliGRAM(s) Oral every 6 hours PRN  aluminum hydroxide/magnesium hydroxide/simethicone Suspension 30 milliLiter(s) Oral every 4 hours PRN  ceFAZolin   IVPB      ceFAZolin   IVPB 1000 milliGRAM(s) IV Intermittent every 8 hours  enoxaparin Injectable 40 milliGRAM(s) SubCutaneous every 24 hours  escitalopram 20 milliGRAM(s) Oral daily  lactated ringers. 500 milliLiter(s) (60 mL/Hr) IV Continuous <Continuous>  melatonin 3 milliGRAM(s) Oral at bedtime PRN  sodium chloride 0.9%. 1000 milliLiter(s) (100 mL/Hr) IV Continuous <Continuous>      PHYSICAL EXAM:  GENERAL: AAOx3, NAD  Cardiovascular: RRR, S1 S2, no m/r/g. No extremities edema, no JVD  LUNGS: Unlabored respirations, CTABL no wheeze/rhonchi/crackles  ABDOMEN: Soft, NTND, large raised rash seen on abdomen to lower breast, warm to touch, no tenderness to palpation  Black marker of border, no expansion 2-3 cm regression from marker line.   EXTREMITIES: Warm extremities, no clubbing, cyanosis, or edema, pulses 2+ bilaterally  NEURO: CN 2-12 grossly intact, strength 5/5 throughout, sensation intact    Notable Labs:    Labs:                          10.9   5.86  )-----------( 232      ( 10 Mar 2024 04:47 )             34.5     03-10    140  |  108<H>  |  6<L>  ----------------------------<  91  4.0   |  21<L>  |  0.47<L>    Ca    8.4      10 Mar 2024 04:47  Phos  2.6     03-10  Mg     2.20     03-10    TPro  6.3  /  Alb  3.5  /  TBili  <0.2  /  DBili  x   /  AST  22  /  ALT  23  /  AlkPhos  50  03-10    LIVER FUNCTIONS - ( 10 Mar 2024 04:47 )  Alb: 3.5 g/dL / Pro: 6.3 g/dL / ALK PHOS: 50 U/L / ALT: 23 U/L / AST: 22 U/L / GGT: x             CAPILLARY BLOOD GLUCOSE                Urinalysis Basic - ( 10 Mar 2024 04:47 )    Color: x / Appearance: x / SG: x / pH: x  Gluc: 91 mg/dL / Ketone: x  / Bili: x / Urobili: x   Blood: x / Protein: x / Nitrite: x   Leuk Esterase: x / RBC: x / WBC x   Sq Epi: x / Non Sq Epi: x / Bacteria: x        Micro:    Culture - Urine (collected 03-08-24 @ 18:09)  Source: Clean Catch Clean Catch (Midstream)  Final Report (03-09-24 @ 23:40):    <10,000 CFU/mL Normal Urogenital Nupur    Culture - Blood (collected 03-08-24 @ 17:36)  Source: .Blood Blood-Venous  Preliminary Report (03-10-24 @ 22:02):    No growth at 48 Hours    Culture - Blood (collected 03-08-24 @ 17:21)  Source: .Blood Blood-Peripheral  Preliminary Report (03-10-24 @ 22:02):    No growth at 48 Hours        RADIOLOGY & ADDITIONAL TESTS:    NNI

## 2024-03-11 NOTE — PROGRESS NOTE ADULT - ASSESSMENT
39-year-old female with a past medical history of OCD on SSRI, breast reduction surgery on 12/7/2023 which was complicated by multiple incisional wounds which are now healed who was admitted to the hospital due to upper abdominal rash involving breast reduction incision sites.    Patient reports sudden onset fevers and abdominal pain in the area where rash erythema developed.  Patient denies any pain upon palpation however did report this was warm to touch.    Upon arrival to the hospital, noted to have fever, leukocytosis of 15, CT of abdomen/pelvis did not reveal any abscess however did show skin thickening.  On exam, area was marked which appears improved today, appears less erythematous as well.    #Abnormal abdominal imaging  #Cellulitis  #History of breast reduction surgery on 12/7/2023  #Chronic sinusitis  #Leukocytosis/pyrexia  Overall, cellulitis improving today - patient at baseline  Derm eval - no further intervention/biopsy indicated  Bcx negative to date  EBV past infection  CMV, HIV negative, Lyme negative    Recommendations  Continue cefazolin  Monitor abdominal marked area - improving today  Follow pending blood cultures  If otherwise ready for discharge, would transition to oral cephalexin 500mg q6hr for 10 days total, end date: 3/18/24  Advised for close follow-up with primary care, plastic surgery  Can follow-up in ID clinic as well, please place referral at discharge  Follow fever curve and WBC count    ID to sign off. Please contact as issues arise.    Aneesh Castillo MD  Division of Infectious Diseases

## 2024-03-11 NOTE — PROGRESS NOTE ADULT - ATTENDING COMMENTS
40 yo female with PMHx of recurrent sinusitis, recent treatment w/abx 6 weeks ago with resolution of sx, history of asthma (rare use of rescue inhaler), depression, p/w with one day of fevers, chills, sinus HA and new onset of rash on anterior upper torso.     #rash started one day ago prior to admission and started right upper abdomen extended to left and onto lower breasts b/l.   Pt with recent breast reduction and liposuction on 12/7/23.   She was seen by plastic sx in ED (see ED progress notes).   ID consulted by ED and Zosyn changed to Ancef as per ID recs. Rash improving today   consulted derm -> recommend abx and no other topical agents needed   Only new medication is supplement berberine that she started taking a few days ago.   QT on presenting EKG prolonged, repeat ECG  Monitor labs and vital signs.     DVT ppx: Lovenox. will need wound care upon discharge  Dispo: pending .
The patient is a 39-year-old woman who has minimal medical history but underwent a breast reduction surgery in December, complicated by wound dehiscence and delayed wound healing, and is admitted now for evaluation and management of cellulitis in the area of her recent surgical scars.     Cellulitis: -Surgical scars noted to be healed for the past three weeks; however, patient states that there was one area of wound that continued to heal and close for subsequent few weeks  -Suspect that microabrasion, or small area of incomplete healing, led to invasion of skin milagros   -Clinically, lacy, erythematous rash (associated with clinical sepsis), appeared to be secondary to strep infection; healed dramatically with antibiotic therapy   -Cellulitis appears today to be improving dramatically as compared to previous images   -Will plan to discharge on 3/12 to complete ten total days of antibiotic therapy (transition from intravenous cefazolin to oral cephalexin on discharge)     Prophylactic measure: -Lovenox for dvt prophylaxis; regular diet; plan for discharge on 3/12
38 yo female with PMHx of recurrent sinusitis, recent treatment w/abx 6 weeks ago with resolution of sx, history of asthma (rare use of rescue inhaler), depression, p/w with one day of fevers, chills, sinus HA and new onset of rash on anterior upper torso.     #rash started one day ago and started right upper abdomen extended to left and onto lower breasts b/l.   Pt with recent breast reduction and liposuction on 12/7/23.   She was seen by plastic sx in ED (see ED progress notes).   ID consulted by ED and Zosyn changed to Ancef as per ID recs. Rash improving today   consulted derm this AM.   Only new medication is supplement berberine that she started taking a few days ago.   QT on presenting EKG prolonged, repeat today  Monitor labs and vital signs.     DVT ppx: Lovenox   Dispo: pending

## 2024-03-11 NOTE — MEDICAL STUDENT PROGRESS NOTE(EDUCATION) - ASSESSMENT
39 year old female with medical hx significant for breast surgery in 12/23 complicated by abscess and wound debridement in 2/23 presenting with erythematous rash on the bilateral lower breasts and upper abdomen i/s/o fever likely due to cellulitis

## 2024-03-11 NOTE — MEDICAL STUDENT PROGRESS NOTE(EDUCATION) - PLAN 1
- erythematous rash on the upper abdomen and lower aspect of the breasts bilaterally, it is unclear if it is related to recent breast surgery.   - margins of rash regressing with the use of antibiotics and is less erythematous than on initial presentation as patient fever and pain from rash has also resolved   - neg for HIV, staph aureus  - blood cultures neg at 48 hours   - continue cefazolin

## 2024-03-11 NOTE — MEDICAL STUDENT PROGRESS NOTE(EDUCATION) - SUBJECTIVE AND OBJECTIVE BOX
39 year old female with medical hx significant for breast surgery in 12/23 complicated by abscess and wound debridement in 2/23 presenting with erythematous rash on the bilateral lower breasts and upper abdomen,     INTERVAL HPI/OVERNIGHT EVENTS:  Patient was seen and examined at bedside. As per nurse and patient, no o/n events, patient resting comfortably. Denies fever, headache , SOB, chest pain, right flank pain. She thinks rash is getting better since it is less erythematous and regressing. She endorses night sweats.     VITAL SIGNS:  T(F): 98.1 (03-11-24 @ 12:20)  HR: 78 (03-11-24 @ 12:20)  BP: 112/71 (03-11-24 @ 12:20)  RR: 18 (03-11-24 @ 12:20)  SpO2: 99% (03-11-24 @ 12:20)  Wt(kg): --    PHYSICAL EXAM:    General: WN/WD NAD  Neurology: A&Ox3,   Eyes: Spontaneous eye movement intact   Respiratory: CTA B/L, No wheezing, rales, rhonchi  CV: RRR, +S1/S2, -S3/S4, no murmurs, rubs or gallops  Abdominal: Soft, NT, ND  Extremities: No edema,   Skin: Erythematous rash on the bilateral lower breasts and upper abdomen, is not tender to palpation, blanches when touched   Incisions:   Tubes:    MEDICATIONS  (STANDING):  ceFAZolin   IVPB      ceFAZolin   IVPB 1000 milliGRAM(s) IV Intermittent every 8 hours  enoxaparin Injectable 40 milliGRAM(s) SubCutaneous every 24 hours  escitalopram 20 milliGRAM(s) Oral daily  sodium chloride 0.9%. 1000 milliLiter(s) (100 mL/Hr) IV Continuous <Continuous>    MEDICATIONS  (PRN):  acetaminophen     Tablet .. 650 milliGRAM(s) Oral every 6 hours PRN Temp greater or equal to 38C (100.4F), Mild Pain (1 - 3)  aluminum hydroxide/magnesium hydroxide/simethicone Suspension 30 milliLiter(s) Oral every 4 hours PRN Dyspepsia  melatonin 3 milliGRAM(s) Oral at bedtime PRN Insomnia      Allergies    watermelon /melons (Angioedema)  No Known Drug Allergies    Intolerances        LABS:                        12.2   6.32  )-----------( 189      ( 11 Mar 2024 05:32 )             41.3     03-11    140  |  105  |  10  ----------------------------<  73  4.7   |  19<L>  |  0.46<L>    Ca    9.2      11 Mar 2024 05:32  Phos  3.7     03-11  Mg     2.10     03-11    TPro  7.2  /  Alb  3.9  /  TBili  <0.2  /  DBili  x   /  AST  29  /  ALT  20  /  AlkPhos  52  03-11      Urinalysis Basic - ( 11 Mar 2024 05:32 )    Color: x / Appearance: x / SG: x / pH: x  Gluc: 73 mg/dL / Ketone: x  / Bili: x / Urobili: x   Blood: x / Protein: x / Nitrite: x   Leuk Esterase: x / RBC: x / WBC x   Sq Epi: x / Non Sq Epi: x / Bacteria: x        RADIOLOGY & ADDITIONAL TESTS:  Reviewed

## 2024-03-11 NOTE — PROGRESS NOTE ADULT - PROBLEM SELECTOR PLAN 1
Few days of rash over abdomen and lower breast, hard and warm to touch, otherwise no tenderness or itchy  Improving in terms of receding borders of cellulitis on abdomen.     Plan:  - Likely cellulitis  - ID recs appreciated  - c/w Ancef 1g q8h and monitor for resolution  - f/u EBV, CMV, HIV, Mycoplasma, Lyme, parvo 19,strep, ciro mountain spotted fever

## 2024-03-11 NOTE — DISCHARGE NOTE PROVIDER - NSDCCPCAREPLAN_GEN_ALL_CORE_FT
PRINCIPAL DISCHARGE DIAGNOSIS  Diagnosis: Cellulitis  Assessment and Plan of Treatment: You came to the hospital with a rash on your abdomen and chest that was concerning for cellulitis. You were started on antibiotics, and your rash improved and your fever and chills resolved. Please follow up with your primary doctor after discharge. Please follow up with your plastic surgeon was well for general followup care.

## 2024-03-12 ENCOUNTER — TRANSCRIPTION ENCOUNTER (OUTPATIENT)
Age: 39
End: 2024-03-12

## 2024-03-12 VITALS
OXYGEN SATURATION: 98 % | SYSTOLIC BLOOD PRESSURE: 115 MMHG | TEMPERATURE: 98 F | DIASTOLIC BLOOD PRESSURE: 68 MMHG | RESPIRATION RATE: 18 BRPM | HEART RATE: 77 BPM

## 2024-03-12 LAB
B19V IGG SER-ACNC: 1.34 INDEX — HIGH (ref 0–0.9)
B19V IGG+IGM SER-IMP: POSITIVE
B19V IGG+IGM SER-IMP: SIGNIFICANT CHANGE UP
B19V IGM FLD-ACNC: 0.17 INDEX — SIGNIFICANT CHANGE UP (ref 0–0.9)
B19V IGM SER-ACNC: NEGATIVE — SIGNIFICANT CHANGE UP
R RICKETTSI AB SER-ACNC: SIGNIFICANT CHANGE UP
R RICKETTSI IGM SER-ACNC: SIGNIFICANT CHANGE UP
RICK SF IGG TITR SER IF: SIGNIFICANT CHANGE UP
RICK SF IGM TITR SER IF: SIGNIFICANT CHANGE UP
ROCKY MT SPOTTED FEVER COMMENT: SIGNIFICANT CHANGE UP

## 2024-03-12 PROCEDURE — 99233 SBSQ HOSP IP/OBS HIGH 50: CPT

## 2024-03-12 RX ADMIN — Medication 650 MILLIGRAM(S): at 12:45

## 2024-03-12 RX ADMIN — Medication 650 MILLIGRAM(S): at 11:45

## 2024-03-12 RX ADMIN — Medication 100 MILLIGRAM(S): at 06:37

## 2024-03-12 RX ADMIN — Medication 100 MILLIGRAM(S): at 13:08

## 2024-03-12 RX ADMIN — ESCITALOPRAM OXALATE 20 MILLIGRAM(S): 10 TABLET, FILM COATED ORAL at 13:08

## 2024-03-12 NOTE — DISCHARGE NOTE NURSING/CASE MANAGEMENT/SOCIAL WORK - NSDCFUADDAPPT_GEN_ALL_CORE_FT
APPTS ARE READY TO BE MADE: [ X] YES    Best Family or Patient Contact (if needed):    Additional Information about above appointments (if needed):    1: PCP  2: Plastic Surgery  3: Infectious Disease, Dr. Castillo    Other comments or requests:

## 2024-03-12 NOTE — PROGRESS NOTE ADULT - PROBLEM SELECTOR PLAN 4
Diet: Regular  DVT ppt: lovenox   Dispo: Pending course likely home Diet: Regular  DVT ppt: lovenox   Dispo: Home

## 2024-03-12 NOTE — PROGRESS NOTE ADULT - SUBJECTIVE AND OBJECTIVE BOX
Patient is a 39y old  Female who presents with a chief complaint of rash fever (11 Mar 2024 18:37)      ======Overnight/Subjective======  Overnight  - No acute event overnight    Subjective    Brief daily plan    ======Medications======  MEDICATIONS  (STANDING):  ceFAZolin   IVPB      ceFAZolin   IVPB 1000 milliGRAM(s) IV Intermittent every 8 hours  enoxaparin Injectable 40 milliGRAM(s) SubCutaneous every 24 hours  escitalopram 20 milliGRAM(s) Oral daily  polyethylene glycol 3350 17 Gram(s) Oral two times a day  senna 2 Tablet(s) Oral at bedtime  sodium chloride 0.9%. 1000 milliLiter(s) (100 mL/Hr) IV Continuous <Continuous>    MEDICATIONS  (PRN):  acetaminophen     Tablet .. 650 milliGRAM(s) Oral every 6 hours PRN Temp greater or equal to 38C (100.4F), Mild Pain (1 - 3)  aluminum hydroxide/magnesium hydroxide/simethicone Suspension 30 milliLiter(s) Oral every 4 hours PRN Dyspepsia  melatonin 3 milliGRAM(s) Oral at bedtime PRN Insomnia      ======Vital Signs======  T(C): 36.2 (24 @ 05:22), Max: 36.8 (24 @ 21:40)  T(F): 97.2 (24 @ 05:22), Max: 98.3 (24 @ 21:40)  HR: 64 (24 @ 05:22) (64 - 78)  BP: 100/52 (24 @ 05:22) (100/52 - 112/71)  BP(mean): --  RR: 17 (24 @ 05:22) (17 - 18)  SpO2: 100% (24 @ 05:22) (99% - 100%)    ======Physical exams======  GENERAL: AAOx3, NAD  Cardiovascular: RRR, S1 S2, no m/r/g. No extremities edema, no JVD  LUNGS: Unlabored respirations, CTABL no wheeze/rhonchi/crackles  ABDOMEN: Soft, NTND, no rebound or guarding, BS presents. No CVA tenderness.  EXTREMITIES: Warm extremities, no clubbing, cyanosis, or edema, pulses 2+ bilaterally  NEURO: CN 2-12 grossly intact, strength 5/5 throughout, sensation intact    ======Labs======                12.2  6.32 >----------< 189  (MCV: 85.7)                41.3   140 | 105 | 10  -----------------------< 73  4.7 | 19<L> | 0.46<L>    TPro: 7.2 / Alb: 3.9 / TBili: <0.2 / DBili: -- / AlkPhos: 52 / ALT: 20 / AST: 29 (24 @ 05:32)  Ca: 9.2 / Phos: 3.7 / M.10 (24 @ 05:32)    Gas: 7.40 / 35<L> / 61<H> / 22 / 92.3<H>% / -2.6<L> (24 @ 21:24)      ======Microbiology======  Urinalysis Basic - ( 11 Mar 2024 05:32 )    Color: x / Appearance: x / SG: x / pH: x  Gluc: 73 mg/dL / Ketone: x  / Bili: x / Urobili: x   Blood: x / Protein: x / Nitrite: x   Leuk Esterase: x / RBC: x / WBC x   Sq Epi: x / Non Sq Epi: x / Bacteria: x          ======I&O's======  I&O's Summary       Patient is a 39y old  Female who presents with a chief complaint of rash fever (11 Mar 2024 18:37)      ======Overnight/Subjective======  Overnight  - No acute event overnight    Subjective  - Complaints of new sore throat, sneezing, and PND, denies cough, sob, chest pain, muscle/joint aches  - No other acute complaints, cellulitis much improved    Brief daily plan  - d/c home with keflex    ======Medications======  MEDICATIONS  (STANDING):  ceFAZolin   IVPB      ceFAZolin   IVPB 1000 milliGRAM(s) IV Intermittent every 8 hours  enoxaparin Injectable 40 milliGRAM(s) SubCutaneous every 24 hours  escitalopram 20 milliGRAM(s) Oral daily  polyethylene glycol 3350 17 Gram(s) Oral two times a day  senna 2 Tablet(s) Oral at bedtime  sodium chloride 0.9%. 1000 milliLiter(s) (100 mL/Hr) IV Continuous <Continuous>    MEDICATIONS  (PRN):  acetaminophen     Tablet .. 650 milliGRAM(s) Oral every 6 hours PRN Temp greater or equal to 38C (100.4F), Mild Pain (1 - 3)  aluminum hydroxide/magnesium hydroxide/simethicone Suspension 30 milliLiter(s) Oral every 4 hours PRN Dyspepsia  melatonin 3 milliGRAM(s) Oral at bedtime PRN Insomnia      ======Vital Signs======  T(C): 36.2 (24 @ 05:22), Max: 36.8 (24 @ 21:40)  T(F): 97.2 (24 @ 05:22), Max: 98.3 (24 @ 21:40)  HR: 64 (24 @ 05:22) (64 - 78)  BP: 100/52 (24 @ 05:22) (100/52 - 112/71)  BP(mean): --  RR: 17 (24 @ 05:22) (17 - 18)  SpO2: 100% (24 @ 05:22) (99% - 100%)    ======Physical exams======  GENERAL: AAOx3, NAD  Cardiovascular: RRR, S1 S2, no m/r/g. No extremities edema, no JVD  LUNGS: Unlabored respirations, CTABL no wheeze/rhonchi/crackles  ABDOMEN: Soft, NTND, no rebound or guarding, BS presents. No CVA tenderness. Cellulitis rash much improved  EXTREMITIES: Warm extremities, no clubbing, cyanosis, or edema, pulses 2+ bilaterally  NEURO: CN 2-12 grossly intact, strength 5/5 throughout, sensation intact    ======Labs======                12.2  6.32 >----------< 189  (MCV: 85.7)                41.3   140 | 105 | 10  -----------------------< 73  4.7 | 19<L> | 0.46<L>    TPro: 7.2 / Alb: 3.9 / TBili: <0.2 / DBili: -- / AlkPhos: 52 / ALT: 20 / AST: 29 (24 @ 05:32)  Ca: 9.2 / Phos: 3.7 / M.10 (24 @ 05:32)    Gas: 7.40 / 35<L> / 61<H> / 22 / 92.3<H>% / -2.6<L> (24 @ 21:24)      ======Microbiology======  Urinalysis Basic - ( 11 Mar 2024 05:32 )    Color: x / Appearance: x / SG: x / pH: x  Gluc: 73 mg/dL / Ketone: x  / Bili: x / Urobili: x   Blood: x / Protein: x / Nitrite: x   Leuk Esterase: x / RBC: x / WBC x   Sq Epi: x / Non Sq Epi: x / Bacteria: x          ======I&O's======  I&O's Summary

## 2024-03-12 NOTE — DISCHARGE NOTE NURSING/CASE MANAGEMENT/SOCIAL WORK - PATIENT PORTAL LINK FT
You can access the FollowMyHealth Patient Portal offered by [facility] by registering at the following website: [email or web address]. By joining [email or web address] [email or web address] portal, you will also be able to view your health information using other applications (apps) compatible with our system.

## 2024-03-12 NOTE — PROGRESS NOTE ADULT - ASSESSMENT
40 yo F with PMH breast reduction 12/7/2023 with Dr. Perez [facility], acute sinusitis treated 6 weeks ago with 7 days course of amoxicillin presenting to ED for facial pain and evaluation of new onset rash on bilateral upper abdomen over breast reduction incision sites, and right flank, likely cellulitis

## 2024-03-12 NOTE — DISCHARGE NOTE NURSING/CASE MANAGEMENT/SOCIAL WORK - NSDCPEFALRISK_GEN_ALL_CORE
For information on Fall & Injury Prevention, visit: [email or web address] OR  [email or web address] OR  https://www.cdc.gov/steadi/patient.html

## 2024-03-12 NOTE — PROGRESS NOTE ADULT - PROBLEM SELECTOR PLAN 1
Few days of rash over abdomen and lower breast, hard and warm to touch, otherwise no tenderness or itchy  Improving in terms of receding borders of cellulitis on abdomen.     Plan:  - ID recs appreciated  - c/w Ancef 1g q8h and monitor for resolution  - Will transition to PO cephalexin 500mg q6h on d/c to complete 10 day course (end date 3/18/24)  - Viral & tick borne rash work up including EBV, CMP, Parvo, Lyme, RMSF unrevealing

## 2024-03-13 LAB
CULTURE RESULTS: SIGNIFICANT CHANGE UP
CULTURE RESULTS: SIGNIFICANT CHANGE UP
SPECIMEN SOURCE: SIGNIFICANT CHANGE UP
SPECIMEN SOURCE: SIGNIFICANT CHANGE UP

## 2024-03-13 NOTE — CHART NOTE - NSCHARTNOTEFT_GEN_A_CORE
Patient was outreached but did not answer. A voicemail was left for the patient to return our call. [phone] 03/13/24
Attempted to see the patient face to face, however the patient was not present to discuss care at that time. Outreached the phone numbers on file and left a voicemail for the patient to return our call.  [phone] 03/12/24

## 2024-03-28 ENCOUNTER — NON-APPOINTMENT (OUTPATIENT)
Age: 39
End: 2024-03-28

## 2024-03-29 ENCOUNTER — NON-APPOINTMENT (OUTPATIENT)
Age: 39
End: 2024-03-29

## 2024-04-04 ENCOUNTER — APPOINTMENT (OUTPATIENT)
Dept: INFECTIOUS DISEASE | Facility: CLINIC | Age: 39
End: 2024-04-04

## 2024-06-04 NOTE — ED PROVIDER NOTE - ATTENDING CONTRIBUTION TO CARE
done
Brief HPI:  39-year-old female past medical history of breast reduction revision with abdominal liposuction 12/8/2023 with Dr. Beatty presents with 1 day of chest and abdominal rash, headache, fever, myalgia, fatigue.  Patient reports red, not itching rash of the underside of her breast and upper abdomen.  Started today, feels it spreading.  Reports fever at home.  Headache is nonacute onset, not maximal in onset.  No recent travel or sick contacts.  Denies tobacco, alcohol, or illicit drug use.  Patient denies photophobia, neck stiffness or pain, cough, chest pain, shortness of breath, diarrhea, dysuria, flank pain.    Vitals:   Reviewed    Exam:    GEN:  Non-toxic appearing, non-distressed, speaking full sentences, non-diaphoretic, AAOx3  HEENT:  NCAT, neck supple, EOMI, PERRLA, sclera anicteric, no conjunctival pallor or injection, no stridor, normal voice, no tonsillar exudate, uvula midline  CV:  regular rhythm and rate, s1/s2 audible, no murmurs, rubs or gallops, peripheral pulses 2+ and symmetric  PULM:  non-labored respirations, lungs clear to auscultation bilaterally, no wheezes, crackles or rales  ABD:  non distended, non-tender, no rebound, no guarding, negative Miller's sign, bowel sounds normal, no cvat  MSK:  no gross deformity, non-tender extremities and joints, range of motion grossly normal appearing, no extremity edema, extremities warm and well perfused   NEURO:  AAOx3, CN II-XII intact, motor 5/5 in upper and lower extremities bilaterally, sensation grossly intact in extremities and trunk, finger to nose testing wnl, no nystagmus, negative Romberg, no pronator drift, no gait deficit  SKIN:  Blanching, erythematous, warm, nontender to extending from lower breast at surgical incision site, inferiorly to epigastric area, no crepitus no induration or warmth, no bulla    A/P:  39-year-old female past medical history of breast reduction revision with abdominal liposuction 12/8/2023 with Dr. Beatty presents with 1 day of chest and abdominal rash, headache, fever, myalgia, fatigue.  Tachycardic, febrile, SIRS positive.  No concern for meningitis at this time as patient without photophobia or nuchal rigidity.  Possible viral syndrome for cellulitis.  Low concern for necrotizing fasciitis given no purulence or bulla.  Will send labs, RVP, antibiotics, CT imaging, plastic surgery consult.  Disposition pending.

## (undated) DEVICE — SOL IRR POUR NS 0.9% 500ML

## (undated) DEVICE — BASIN SET DOUBLE

## (undated) DEVICE — TUBING HI-VAC PSI-TEC STERILE

## (undated) DEVICE — DRAPE 3/4 SHEET 52X76"

## (undated) DEVICE — ELCTR GROUNDING PAD ADULT COVIDIEN

## (undated) DEVICE — PACK MINOR WITH LAP

## (undated) DEVICE — DRAPE TOWEL BLUE 17" X 24"

## (undated) DEVICE — Device

## (undated) DEVICE — DRAPE SPLIT SHEET 77" X 120"

## (undated) DEVICE — ELCTR BOVIE TIP BLADE INSULATED 2.75" EDGE

## (undated) DEVICE — WARMING BLANKET FULL ADULT

## (undated) DEVICE — DRAIN RESERVOIR FOR JACKSON PRATT 100CC CARDINAL

## (undated) DEVICE — LABELS BLANK W PEN

## (undated) DEVICE — DRAPE INSTRUMENT POUCH 6.75" X 11"

## (undated) DEVICE — POSITIONER STRAP ARMBOARD VELCRO TS-30

## (undated) DEVICE — TUBING INFILTRATION

## (undated) DEVICE — GLV 8 PROTEXIS (WHITE)

## (undated) DEVICE — PREP BETADINE SPONGE STICKS

## (undated) DEVICE — VENODYNE/SCD SLEEVE CALF MEDIUM